# Patient Record
Sex: FEMALE
[De-identification: names, ages, dates, MRNs, and addresses within clinical notes are randomized per-mention and may not be internally consistent; named-entity substitution may affect disease eponyms.]

---

## 2022-06-07 PROBLEM — Z00.00 ENCOUNTER FOR PREVENTIVE HEALTH EXAMINATION: Status: ACTIVE | Noted: 2022-06-07

## 2022-06-14 ENCOUNTER — APPOINTMENT (OUTPATIENT)
Dept: OTOLARYNGOLOGY | Facility: CLINIC | Age: 62
End: 2022-06-14
Payer: MEDICARE

## 2022-06-14 VITALS
WEIGHT: 175 LBS | OXYGEN SATURATION: 98 % | SYSTOLIC BLOOD PRESSURE: 152 MMHG | BODY MASS INDEX: 27.47 KG/M2 | DIASTOLIC BLOOD PRESSURE: 90 MMHG | HEART RATE: 68 BPM | HEIGHT: 67 IN

## 2022-06-14 DIAGNOSIS — Z86.79 PERSONAL HISTORY OF OTHER DISEASES OF THE CIRCULATORY SYSTEM: ICD-10-CM

## 2022-06-14 DIAGNOSIS — Z86.59 PERSONAL HISTORY OF OTHER MENTAL AND BEHAVIORAL DISORDERS: ICD-10-CM

## 2022-06-14 DIAGNOSIS — Z78.9 OTHER SPECIFIED HEALTH STATUS: ICD-10-CM

## 2022-06-14 PROCEDURE — 99203 OFFICE O/P NEW LOW 30 MIN: CPT

## 2022-06-14 NOTE — HISTORY OF PRESENT ILLNESS
[None] : No associated symptoms are reported. [de-identified] :  is a 60 yo female who is being referred by Dr. Levine (oncologist) for suspicious thyroid nodule. She reports this was an incidental finding during a carotid study this year in March. Denies pain, dysphagia, dysphonia and or dyspnea \par 5/24/2022 FNA right mid thyroid 2.3 cm showing positive for malignancy PTCA\par 6/8/2022 TSH wnl, not on thyroid med. \par 5/5/2022 thyroid ultrasound  showing suspicious right thyroid 2.3 cm nodule. No enlarged cervical lymph nodes see.\par Denies family history of thyroid disease or thyroid cancer. \par Never smoked, rare alcohol intake\par Received Covid 19 booster vacc.

## 2022-06-14 NOTE — PHYSICAL EXAM
[Nodule] : nodule [FreeTextEntry1] : sl fullness R lobe thyroid. No palp nodes [Midline] : trachea located in midline position [Normal] : no rashes

## 2022-06-14 NOTE — CONSULT LETTER
[Dear  ___] : Dear  [unfilled], [DrFiorella  ___] : Dr. GRIFFITH [Consult Letter:] : I had the pleasure of evaluating your patient, [unfilled]. [Please see my note below.] : Please see my note below. [Consult Closing:] : Thank you very much for allowing me to participate in the care of this patient.  If you have any questions, please do not hesitate to contact me. [Sincerely,] : Sincerely, [FreeTextEntry2] : John Levine MD ([place], [place]) [FreeTextEntry3] : Edmundo Murray MD, FACS\par \par    [facility]\par Associate Chair\par    Department of Otolaryngology\par \par Professor\par Otolaryngology & Molecular Medicine\par [facility] [employer]\par

## 2022-07-06 ENCOUNTER — APPOINTMENT (OUTPATIENT)
Dept: ENDOCRINOLOGY | Facility: CLINIC | Age: 62
End: 2022-07-06

## 2022-07-06 VITALS
HEIGHT: 67 IN | OXYGEN SATURATION: 99 % | SYSTOLIC BLOOD PRESSURE: 132 MMHG | WEIGHT: 170 LBS | HEART RATE: 76 BPM | BODY MASS INDEX: 26.68 KG/M2 | DIASTOLIC BLOOD PRESSURE: 86 MMHG

## 2022-07-06 PROCEDURE — 99205 OFFICE O/P NEW HI 60 MIN: CPT

## 2022-07-22 DIAGNOSIS — Z01.818 ENCOUNTER FOR OTHER PREPROCEDURAL EXAMINATION: ICD-10-CM

## 2022-07-29 ENCOUNTER — OUTPATIENT (OUTPATIENT)
Dept: OUTPATIENT SERVICES | Facility: HOSPITAL | Age: 62
LOS: 1 days | End: 2022-07-29
Payer: COMMERCIAL

## 2022-07-29 VITALS
HEIGHT: 67.25 IN | RESPIRATION RATE: 16 BRPM | TEMPERATURE: 98 F | OXYGEN SATURATION: 98 % | WEIGHT: 180.34 LBS | HEART RATE: 70 BPM | DIASTOLIC BLOOD PRESSURE: 84 MMHG | SYSTOLIC BLOOD PRESSURE: 146 MMHG

## 2022-07-29 DIAGNOSIS — Z01.818 ENCOUNTER FOR OTHER PREPROCEDURAL EXAMINATION: ICD-10-CM

## 2022-07-29 DIAGNOSIS — C73 MALIGNANT NEOPLASM OF THYROID GLAND: ICD-10-CM

## 2022-07-29 DIAGNOSIS — E04.1 NONTOXIC SINGLE THYROID NODULE: ICD-10-CM

## 2022-07-29 DIAGNOSIS — Z90.49 ACQUIRED ABSENCE OF OTHER SPECIFIED PARTS OF DIGESTIVE TRACT: Chronic | ICD-10-CM

## 2022-07-29 LAB
HCT VFR BLD CALC: 38.2 % — SIGNIFICANT CHANGE UP (ref 34.5–45)
HGB BLD-MCNC: 11.5 G/DL — SIGNIFICANT CHANGE UP (ref 11.5–15.5)
MCHC RBC-ENTMCNC: 23.8 PG — LOW (ref 27–34)
MCHC RBC-ENTMCNC: 30.1 GM/DL — LOW (ref 32–36)
MCV RBC AUTO: 79.1 FL — LOW (ref 80–100)
NRBC # BLD: 0 /100 WBCS — SIGNIFICANT CHANGE UP (ref 0–0)
PLATELET # BLD AUTO: 350 K/UL — SIGNIFICANT CHANGE UP (ref 150–400)
RBC # BLD: 4.83 M/UL — SIGNIFICANT CHANGE UP (ref 3.8–5.2)
RBC # FLD: 15.7 % — HIGH (ref 10.3–14.5)
WBC # BLD: 10.32 K/UL — SIGNIFICANT CHANGE UP (ref 3.8–10.5)
WBC # FLD AUTO: 10.32 K/UL — SIGNIFICANT CHANGE UP (ref 3.8–10.5)

## 2022-07-29 PROCEDURE — 36415 COLL VENOUS BLD VENIPUNCTURE: CPT

## 2022-07-29 PROCEDURE — G0463: CPT

## 2022-07-29 PROCEDURE — 85027 COMPLETE CBC AUTOMATED: CPT

## 2022-07-29 NOTE — H&P PST ADULT - HISTORY OF PRESENT ILLNESS
60 y/o female presents for PST. Per patient she she was diagnosed with a right thyroid nodule 05/2022 and followed up with endocrinologist who performed sonogram and biopsy which resulted cancer of the right thyroid. Patient denies hx of covid and stated that she is covid vaccinated

## 2022-07-29 NOTE — H&P PST ADULT - PROBLEM SELECTOR PLAN 1
Patient schedule for right thyroid lobectomy. Labs, ekg, covid testing, medical and cardiac clearance pending.

## 2022-07-29 NOTE — H&P PST ADULT - FALL HARM RISK - UNIVERSAL INTERVENTIONS
Bed in lowest position, wheels locked, appropriate side rails in place/Call bell, personal items and telephone in reach/Instruct patient to call for assistance before getting out of bed or chair/Non-slip footwear when patient is out of bed/[place] to call system/Physically safe environment - no spills, clutter or unnecessary equipment/Purposeful Proactive Rounding/Room/bathroom lighting operational, light cord in reach

## 2022-07-29 NOTE — H&P PST ADULT - PRO PAIN EXPRESSION
Interval History: Resting comfortably this AM    Review of Systems   Constitutional: Negative for chills and fever.   Respiratory: Negative for cough and shortness of breath.    Cardiovascular: Negative for chest pain and leg swelling.   Gastrointestinal: Negative for abdominal distention and abdominal pain.     Objective:     Vital Signs (Most Recent):  Temp: 98.1 °F (36.7 °C) (01/14/22 1152)  Pulse: 85 (01/14/22 1152)  Resp: 19 (01/14/22 1152)  BP: (!) 159/70 (01/14/22 1152)  SpO2: 96 % (01/14/22 1152) Vital Signs (24h Range):  Temp:  [97.5 °F (36.4 °C)-99.4 °F (37.4 °C)] 98.1 °F (36.7 °C)  Pulse:  [85-98] 85  Resp:  [18-20] 19  SpO2:  [96 %-100 %] 96 %  BP: (122-159)/(63-80) 159/70     Weight: 64.3 kg (141 lb 12.1 oz)  Body mass index is 20.34 kg/m².    Intake/Output Summary (Last 24 hours) at 1/14/2022 1158  Last data filed at 1/13/2022 1800  Gross per 24 hour   Intake 100 ml   Output --   Net 100 ml      Physical Exam  Constitutional:       General: He is not in acute distress.     Appearance: He is ill-appearing. He is not toxic-appearing or diaphoretic.   Cardiovascular:      Rate and Rhythm: Normal rate and regular rhythm.      Heart sounds: No murmur heard.  No gallop.    Pulmonary:      Effort: Pulmonary effort is normal. No respiratory distress.      Breath sounds: Normal breath sounds. No wheezing.   Abdominal:      General: Bowel sounds are normal. There is no distension.      Palpations: Abdomen is soft.      Tenderness: There is no abdominal tenderness.         Significant Labs: All pertinent labs within the past 24 hours have been reviewed.    Significant Imaging: I have reviewed all pertinent imaging results/findings within the past 24 hours.   none

## 2022-07-29 NOTE — H&P PST ADULT - NSICDXFAMILYHX_GEN_ALL_CORE_FT
FAMILY HISTORY:  Father  Still living? Yes, Estimated age: Age Unknown  FH: heart disease, Age at diagnosis: Age Unknown  FH: HTN (hypertension), Age at diagnosis: Age Unknown    Mother  Still living? No  FH: HTN (hypertension), Age at diagnosis: Age Unknown  FH: lung cancer, Age at diagnosis: Age Unknown  FH: ovarian cancer, Age at diagnosis: Age Unknown    Sibling  Still living? Yes, Estimated age: Age Unknown  FH: stomach cancer, Age at diagnosis: Age Unknown

## 2022-08-02 PROBLEM — C73 MALIGNANT NEOPLASM OF THYROID GLAND: Chronic | Status: ACTIVE | Noted: 2022-07-29

## 2022-08-02 PROBLEM — F41.9 ANXIETY DISORDER, UNSPECIFIED: Chronic | Status: ACTIVE | Noted: 2022-07-29

## 2022-08-02 PROBLEM — I10 ESSENTIAL (PRIMARY) HYPERTENSION: Chronic | Status: ACTIVE | Noted: 2022-07-29

## 2022-08-03 LAB — SARS-COV-2 N GENE NPH QL NAA+PROBE: NOT DETECTED

## 2022-08-04 ENCOUNTER — TRANSCRIPTION ENCOUNTER (OUTPATIENT)
Age: 62
End: 2022-08-04

## 2022-08-04 NOTE — ASU PATIENT PROFILE, ADULT - FALL HARM RISK - UNIVERSAL INTERVENTIONS
Bed in lowest position, wheels locked, appropriate side rails in place/Call bell, personal items and telephone in reach/Instruct patient to call for assistance before getting out of bed or chair/Non-slip footwear when patient is out of bed/[place] to call system/Physically safe environment - no spills, clutter or unnecessary equipment/Purposeful Proactive Rounding/Room/bathroom lighting operational, light cord in reach

## 2022-08-04 NOTE — ASU PATIENT PROFILE, ADULT - FALL HARM RISK - TYPE OF ASSESSMENT
December 9, 2019       Patient: Bette Corey   YOB: 1994   Date of Visit: 12/9/2019         To Whom It May Concern:    It is my medical opinion that Bette Corey was not able to attend work today      Sincerely,          Fam Lawrence M.D.  Electronically Signed     
Admission

## 2022-08-05 ENCOUNTER — APPOINTMENT (OUTPATIENT)
Dept: OTOLARYNGOLOGY | Facility: HOSPITAL | Age: 62
End: 2022-08-05

## 2022-08-05 ENCOUNTER — TRANSCRIPTION ENCOUNTER (OUTPATIENT)
Age: 62
End: 2022-08-05

## 2022-08-05 ENCOUNTER — RESULT REVIEW (OUTPATIENT)
Age: 62
End: 2022-08-05

## 2022-08-05 ENCOUNTER — OUTPATIENT (OUTPATIENT)
Dept: OUTPATIENT SERVICES | Facility: HOSPITAL | Age: 62
LOS: 1 days | Discharge: HOME | End: 2022-08-05
Payer: COMMERCIAL

## 2022-08-05 VITALS
WEIGHT: 180.34 LBS | SYSTOLIC BLOOD PRESSURE: 155 MMHG | DIASTOLIC BLOOD PRESSURE: 93 MMHG | HEIGHT: 67.25 IN | HEART RATE: 58 BPM | OXYGEN SATURATION: 100 % | RESPIRATION RATE: 18 BRPM | TEMPERATURE: 98 F

## 2022-08-05 VITALS
DIASTOLIC BLOOD PRESSURE: 81 MMHG | OXYGEN SATURATION: 100 % | HEART RATE: 72 BPM | SYSTOLIC BLOOD PRESSURE: 146 MMHG | RESPIRATION RATE: 14 BRPM

## 2022-08-05 DIAGNOSIS — E04.1 NONTOXIC SINGLE THYROID NODULE: ICD-10-CM

## 2022-08-05 DIAGNOSIS — Z90.49 ACQUIRED ABSENCE OF OTHER SPECIFIED PARTS OF DIGESTIVE TRACT: Chronic | ICD-10-CM

## 2022-08-05 PROCEDURE — 88305 TISSUE EXAM BY PATHOLOGIST: CPT | Mod: 26

## 2022-08-05 PROCEDURE — 60220 PARTIAL REMOVAL OF THYROID: CPT | Mod: GC,RT

## 2022-08-05 PROCEDURE — 88307 TISSUE EXAM BY PATHOLOGIST: CPT | Mod: 26

## 2022-08-05 DEVICE — LIGATING CLIPS WECK HORIZON MEDIUM (BLUE) 24: Type: IMPLANTABLE DEVICE | Status: FUNCTIONAL

## 2022-08-05 DEVICE — SURGICEL 2 X 14": Type: IMPLANTABLE DEVICE | Status: FUNCTIONAL

## 2022-08-05 DEVICE — CARTRIDGE MICROCLIP 30: Type: IMPLANTABLE DEVICE | Status: FUNCTIONAL

## 2022-08-05 DEVICE — LIGATING CLIPS WECK HORIZON SMALL-WIDE (RED) 24: Type: IMPLANTABLE DEVICE | Status: FUNCTIONAL

## 2022-08-05 RX ORDER — LOSARTAN POTASSIUM 100 MG/1
1 TABLET, FILM COATED ORAL
Qty: 0 | Refills: 0 | DISCHARGE

## 2022-08-05 RX ORDER — ACETAMINOPHEN 500 MG
31.25 TABLET ORAL
Qty: 0 | Refills: 0 | DISCHARGE
Start: 2022-08-05

## 2022-08-05 RX ORDER — ACETAMINOPHEN 500 MG
2 TABLET ORAL
Qty: 160 | Refills: 0
Start: 2022-08-05 | End: 2022-08-24

## 2022-08-05 RX ORDER — ONDANSETRON 8 MG/1
4 TABLET, FILM COATED ORAL EVERY 4 HOURS
Refills: 0 | Status: DISCONTINUED | OUTPATIENT
Start: 2022-08-05 | End: 2022-08-19

## 2022-08-05 RX ORDER — ACETAMINOPHEN 500 MG
1000 TABLET ORAL EVERY 6 HOURS
Refills: 0 | Status: DISCONTINUED | OUTPATIENT
Start: 2022-08-05 | End: 2022-08-19

## 2022-08-05 RX ORDER — ESCITALOPRAM OXALATE 10 MG/1
1 TABLET, FILM COATED ORAL
Qty: 0 | Refills: 0 | DISCHARGE

## 2022-08-05 RX ORDER — SODIUM CHLORIDE 9 MG/ML
1000 INJECTION, SOLUTION INTRAVENOUS
Refills: 0 | Status: DISCONTINUED | OUTPATIENT
Start: 2022-08-05 | End: 2022-08-19

## 2022-08-05 NOTE — ASU DISCHARGE PLAN (ADULT/PEDIATRIC) - NURSING INSTRUCTIONS
You received Tylenol in the operating room at 8:00 am. Do not take any medications containing Tylenol (including Percocet) until after 2:00 pm today

## 2022-08-05 NOTE — ASU DISCHARGE PLAN (ADULT/PEDIATRIC) - ASU DC SPECIAL INSTRUCTIONSFT
Activity: No heavy lifting or strenuous activity. Walk as tolerated. Physical therapy per routine. Wound Care: Keep wound dry for 1 day. Then wash your wound Daily with soap and water daily. Pat dry. You may shower. No baths, hot tubs or swimming until approved by your surgeon. Follow up 1 - 2 weeks after discharge. Call office for appointment.  Office: [phone].  Call office for fever >101.5 or redness or drainage from wound.

## 2022-08-05 NOTE — ASU DISCHARGE PLAN (ADULT/PEDIATRIC) - CARE PROVIDER_API CALL
Edmundo Murray)  [facility]; Otolaryngology  [address] [place] [id number]  Phone: [phone]  Fax: [phone]  Follow Up Time:

## 2022-08-06 ENCOUNTER — NON-APPOINTMENT (OUTPATIENT)
Age: 62
End: 2022-08-06

## 2022-08-10 LAB — SURGICAL PATHOLOGY STUDY: SIGNIFICANT CHANGE UP

## 2022-08-11 ENCOUNTER — APPOINTMENT (OUTPATIENT)
Dept: OTOLARYNGOLOGY | Facility: CLINIC | Age: 62
End: 2022-08-11

## 2022-08-11 PROCEDURE — 99024 POSTOP FOLLOW-UP VISIT: CPT

## 2022-08-11 NOTE — HISTORY OF PRESENT ILLNESS
[None] : No associated symptoms are reported. [de-identified] : Ms. Hodges is s/p Right thyroid lobectomy with isthmusectomy on 8/5/2022 for PTCA. Presents today for incision assessment and suture removal. Reports feeling well. Denies fever,  pain, dysphagia, dysphonia and or dyspnea \par Appt. with Dr. Rivas  on 9/8/2022

## 2022-08-11 NOTE — CONSULT LETTER
[Dear  ___] : Dear  [unfilled], [DrFiorella  ___] : Dr. GRIFFITH [Courtesy Letter:] : I had the pleasure of seeing your patient, [unfilled], in my office today. [Please see my note below.] : Please see my note below. [Sincerely,] : Sincerely, [FreeTextEntry2] : John Levine MD ([place], [place]) [FreeTextEntry3] : Virginie Moore NP\par Edmundo Murray MD, FACS\par [facility]\par [address]\[place] [address]\par Tel: [phone]\par \par [facility]\par Associate Chair\par Department of Otolaryngology\par Professor of Otolaryngology & Molecular Medicine\par [employer] [facility] [employer]\par \par

## 2022-08-11 NOTE — REASON FOR VISIT
[Post-Operative Visit] : a post-operative visit [FreeTextEntry2] : s/p Right thyroid lobectomy with isthmusectomy on 8/5/2022

## 2022-09-07 LAB — TSH SERPL-ACNC: 3.86

## 2022-09-08 ENCOUNTER — APPOINTMENT (OUTPATIENT)
Dept: ENDOCRINOLOGY | Facility: CLINIC | Age: 62
End: 2022-09-08

## 2022-09-08 ENCOUNTER — NON-APPOINTMENT (OUTPATIENT)
Age: 62
End: 2022-09-08

## 2022-09-08 VITALS
HEART RATE: 93 BPM | BODY MASS INDEX: 28.72 KG/M2 | WEIGHT: 183 LBS | HEIGHT: 67 IN | SYSTOLIC BLOOD PRESSURE: 130 MMHG | DIASTOLIC BLOOD PRESSURE: 80 MMHG

## 2022-09-08 PROCEDURE — 99215 OFFICE O/P EST HI 40 MIN: CPT

## 2022-09-08 RX ORDER — LOSARTAN POTASSIUM AND HYDROCHLOROTHIAZIDE 12.5; 1 MG/1; MG/1
100-12.5 TABLET ORAL DAILY
Qty: 90 | Refills: 0 | Status: ACTIVE | COMMUNITY

## 2022-09-08 RX ORDER — ESCITALOPRAM OXALATE 5 MG/1
TABLET, FILM COATED ORAL
Refills: 0 | Status: DISCONTINUED | COMMUNITY
End: 2022-09-08

## 2022-09-08 RX ORDER — LOSARTAN POTASSIUM 100 MG/1
TABLET, FILM COATED ORAL
Refills: 0 | Status: DISCONTINUED | COMMUNITY
End: 2022-09-08

## 2022-09-08 RX ORDER — HYDROCHLOROTHIAZIDE 12.5 MG/1
TABLET ORAL
Refills: 0 | Status: DISCONTINUED | COMMUNITY
End: 2022-09-08

## 2022-09-13 ENCOUNTER — APPOINTMENT (OUTPATIENT)
Dept: OTOLARYNGOLOGY | Facility: CLINIC | Age: 62
End: 2022-09-13

## 2022-09-13 VITALS
HEART RATE: 90 BPM | SYSTOLIC BLOOD PRESSURE: 144 MMHG | OXYGEN SATURATION: 98 % | DIASTOLIC BLOOD PRESSURE: 84 MMHG | HEIGHT: 67 IN | BODY MASS INDEX: 28.72 KG/M2 | WEIGHT: 183 LBS

## 2022-09-13 PROCEDURE — 99024 POSTOP FOLLOW-UP VISIT: CPT

## 2022-09-13 NOTE — CONSULT LETTER
[Dear  ___] : Dear  [unfilled], [DrFiorella  ___] : Dr. GRIFFITH [FreeTextEntry2] : John Levine MD ([place], [place]) \par \par

## 2022-09-13 NOTE — HISTORY OF PRESENT ILLNESS
[None] : No associated symptoms are reported. [de-identified] : Ms. Hodges is s/p Right thyroid lobectomy with isthmusectomy on 8/5/2022 for PTCA. Presents today for post op follow up. Denies pain, dysphagia, dysphonia and or dyspnea \par Following up  with Dr. Rivas  \par 9/1/2022 TFTs . Placed  on Levothyroxine 25mcg daily and VItD 4,000 IUs daily otc \par \par Case iscussed in [facility] and concensus is that surveillance at this time appropriate.

## 2022-09-29 ENCOUNTER — APPOINTMENT (OUTPATIENT)
Dept: COLORECTAL SURGERY | Facility: CLINIC | Age: 62
End: 2022-09-29

## 2022-09-29 DIAGNOSIS — Z80.1 FAMILY HISTORY OF MALIGNANT NEOPLASM OF TRACHEA, BRONCHUS AND LUNG: ICD-10-CM

## 2022-09-29 DIAGNOSIS — Z80.41 FAMILY HISTORY OF MALIGNANT NEOPLASM OF OVARY: ICD-10-CM

## 2022-09-29 PROCEDURE — 99441: CPT

## 2022-09-30 PROBLEM — Z80.1 FAMILY HISTORY OF LUNG CANCER: Status: ACTIVE | Noted: 2022-09-30

## 2022-09-30 PROBLEM — Z80.41 FAMILY HISTORY OF OVARIAN CANCER: Status: ACTIVE | Noted: 2022-09-30

## 2022-09-30 NOTE — REASON FOR VISIT
[Home] : at home, [unfilled] , at the time of the visit. [Medical Office: ([facility])___] : at the medical office located in  [Patient] : the patient [Consultation] : a consultation visit

## 2022-09-30 NOTE — HISTORY OF PRESENT ILLNESS
[FreeTextEntry1] : 61-year-old female who presents for a telehealth visit for recent colonoscopy findings of a rectal mass.  Her symptoms began at the beginning of the pandemic in 2020 where she noticed some changes in her bowel function.  She had increased number of bowel movements, changes in the caliber of her stool and excessive gas.  She also reports fecal urgency especially with liquid bowel movements.  She was dealing with her elderly parents and as such did not seek urgent attention for this issue.  She was also noted to have a right thyroid nodule on evaluation of her carotids by her cardiologist which prompted a right thyroidectomy for cancer.  She denies any rectal bleeding.  She has lost about 10 pounds over this period of time.  She underwent a colonoscopy by Dr. Abebe which showed diffusely ulcerated friable mucosa up to 15 cm where there was rectosigmoid narrowing precluding completion of the colonoscopy.  There was also a mass in the rectum and biopsy of this returned as hyperplastic colorectal mucosa with fibropurulent exudate.  The rectosigmoid junction had the same fibropurulent exudate with fragments of high-grade dysplasia plastic colonic mucosa but no malignant cells identified.\par \par CT scan of the abdomen showed a mass at the rectosigmoid junction with exophytic nodules arising from the distal sigmoid as well as circumferential thickening of the rectum.  There is an 11 mm ischio rectal lymph node.\par

## 2022-09-30 NOTE — CONSULT LETTER
[Dear  ___] : Dear  [unfilled], [Consult Letter:] : I had the pleasure of evaluating your patient, [unfilled]. [Please see my note below.] : Please see my note below. [Consult Closing:] : Thank you very much for allowing me to participate in the care of this patient.  If you have any questions, please do not hesitate to contact me. [Sincerely,] : Sincerely, [FreeTextEntry3] : Haile Peter MD\par

## 2022-10-07 ENCOUNTER — RESULT REVIEW (OUTPATIENT)
Age: 62
End: 2022-10-07

## 2022-10-07 ENCOUNTER — APPOINTMENT (OUTPATIENT)
Dept: MRI IMAGING | Facility: CLINIC | Age: 62
End: 2022-10-07

## 2022-10-07 ENCOUNTER — OUTPATIENT (OUTPATIENT)
Dept: OUTPATIENT SERVICES | Facility: HOSPITAL | Age: 62
LOS: 1 days | End: 2022-10-07
Payer: COMMERCIAL

## 2022-10-07 ENCOUNTER — APPOINTMENT (OUTPATIENT)
Dept: RADIOLOGY | Facility: CLINIC | Age: 62
End: 2022-10-07

## 2022-10-07 DIAGNOSIS — K62.89 OTHER SPECIFIED DISEASES OF ANUS AND RECTUM: ICD-10-CM

## 2022-10-07 DIAGNOSIS — Z90.49 ACQUIRED ABSENCE OF OTHER SPECIFIED PARTS OF DIGESTIVE TRACT: Chronic | ICD-10-CM

## 2022-10-07 PROCEDURE — 72197 MRI PELVIS W/O & W/DYE: CPT | Mod: 26

## 2022-10-07 PROCEDURE — 74018 RADEX ABDOMEN 1 VIEW: CPT | Mod: 26

## 2022-10-07 PROCEDURE — 74018 RADEX ABDOMEN 1 VIEW: CPT

## 2022-10-07 PROCEDURE — 72197 MRI PELVIS W/O & W/DYE: CPT

## 2022-10-07 PROCEDURE — A9585: CPT

## 2022-10-17 ENCOUNTER — NON-APPOINTMENT (OUTPATIENT)
Age: 62
End: 2022-10-17

## 2022-10-24 LAB — SARS-COV-2 N GENE NPH QL NAA+PROBE: NOT DETECTED

## 2022-10-26 ENCOUNTER — APPOINTMENT (OUTPATIENT)
Dept: COLORECTAL SURGERY | Facility: AMBULATORY SURGERY CENTER | Age: 62
End: 2022-10-26
Payer: COMMERCIAL

## 2022-10-26 ENCOUNTER — RESULT REVIEW (OUTPATIENT)
Age: 62
End: 2022-10-26

## 2022-10-26 DIAGNOSIS — K62.89 OTHER SPECIFIED DISEASES OF ANUS AND RECTUM: ICD-10-CM

## 2022-10-26 PROCEDURE — 46606 ANOSCOPY AND BIOPSY: CPT

## 2022-10-30 PROBLEM — K62.89 RECTAL MASS: Status: ACTIVE | Noted: 2022-09-29

## 2022-11-29 NOTE — ASU PREOP CHECKLIST - DENTURES
Name from pharmacy: VICTOZA 18MG/3ML INJ PEN 3ML(3PACK)          Will file in chart as: Victoza 18 MG/3ML pen-injector    Sig: ADMINISTER 1.2 MG UNDER THE SKIN DAILY    Disp:  9 mL    Refills:  3 (Pharmacy requested: Not specified)    Start: 11/29/2022    Class: Eprescribe    For: Type 2 diabetes mellitus without complication, with long-term current use of insulin (CMS/[facility])    Last ordered: 5 months ago by Micheal Hendrix MD Last refill: 10/30/2022    Rx #: [id number]     Medication filled per protocol, appointment pending, last seen 7/8/22    
no

## 2023-01-04 LAB — TSH SERPL-ACNC: 5.85

## 2023-01-05 ENCOUNTER — APPOINTMENT (OUTPATIENT)
Dept: ENDOCRINOLOGY | Facility: CLINIC | Age: 63
End: 2023-01-05
Payer: MEDICAID

## 2023-01-05 ENCOUNTER — APPOINTMENT (OUTPATIENT)
Dept: ENDOCRINOLOGY | Facility: CLINIC | Age: 63
End: 2023-01-05
Payer: COMMERCIAL

## 2023-01-05 VITALS
WEIGHT: 175 LBS | HEIGHT: 67 IN | SYSTOLIC BLOOD PRESSURE: 112 MMHG | OXYGEN SATURATION: 98 % | HEART RATE: 76 BPM | DIASTOLIC BLOOD PRESSURE: 74 MMHG | BODY MASS INDEX: 27.47 KG/M2

## 2023-01-05 PROCEDURE — 76536 US EXAM OF HEAD AND NECK: CPT

## 2023-01-05 PROCEDURE — 99215 OFFICE O/P EST HI 40 MIN: CPT

## 2023-01-08 NOTE — PROCEDURE
[[employer] e 2008 model, 10-12 MHz frequencies] : multiple real time longitudinal and transverse images were obtained using a high resolution ultrasound with a linear transducer, [employer] e 2008 model, 10-12 MHz frequencies. All measurements will be reported as longitudinal x rosy-posterior x transverse. [Report dated ___] : Report dated [unfilled] [Thyroid Cancer] : thyroid cancer [] : a heterogeneous parenchyma [There are no distinct nodules visualized.] : There are no distinct nodules visualized. [FreeTextEntry5] : 4.1x1.7x1.2

## 2023-01-08 NOTE — IMPRESSION
[FreeTextEntry1] : s/p R lobectomy and isthmusectomy. Mildly heterogenous left thyroid lobe. No nodules visualized [FreeTextEntry2] : repeat ultrasound in 6 months

## 2023-03-06 ENCOUNTER — RX RENEWAL (OUTPATIENT)
Age: 63
End: 2023-03-06

## 2023-03-14 ENCOUNTER — APPOINTMENT (OUTPATIENT)
Dept: OTOLARYNGOLOGY | Facility: CLINIC | Age: 63
End: 2023-03-14

## 2023-05-24 LAB — TSH SERPL-ACNC: 8.71

## 2023-05-25 ENCOUNTER — APPOINTMENT (OUTPATIENT)
Dept: ENDOCRINOLOGY | Facility: CLINIC | Age: 63
End: 2023-05-25
Payer: MEDICAID

## 2023-05-25 VITALS
DIASTOLIC BLOOD PRESSURE: 70 MMHG | HEIGHT: 67 IN | BODY MASS INDEX: 29.03 KG/M2 | WEIGHT: 185 LBS | OXYGEN SATURATION: 97 % | SYSTOLIC BLOOD PRESSURE: 110 MMHG | HEART RATE: 77 BPM

## 2023-05-25 PROCEDURE — 99214 OFFICE O/P EST MOD 30 MIN: CPT

## 2023-05-25 RX ORDER — LEVOTHYROXINE SODIUM 0.03 MG/1
25 TABLET ORAL DAILY
Qty: 90 | Refills: 1 | Status: DISCONTINUED | COMMUNITY
Start: 2023-03-06 | End: 2023-05-25

## 2023-08-03 ENCOUNTER — APPOINTMENT (OUTPATIENT)
Dept: ENDOCRINOLOGY | Facility: CLINIC | Age: 63
End: 2023-08-03
Payer: MEDICAID

## 2023-08-03 VITALS
HEART RATE: 71 BPM | BODY MASS INDEX: 27 KG/M2 | OXYGEN SATURATION: 98 % | DIASTOLIC BLOOD PRESSURE: 80 MMHG | SYSTOLIC BLOOD PRESSURE: 120 MMHG | HEIGHT: 67 IN | WEIGHT: 172 LBS

## 2023-08-03 DIAGNOSIS — Z13.1 ENCOUNTER FOR SCREENING FOR DIABETES MELLITUS: ICD-10-CM

## 2023-08-03 DIAGNOSIS — E04.1 NONTOXIC SINGLE THYROID NODULE: ICD-10-CM

## 2023-08-03 DIAGNOSIS — C20 MALIGNANT NEOPLASM OF RECTUM: ICD-10-CM

## 2023-08-03 PROCEDURE — 76536 US EXAM OF HEAD AND NECK: CPT

## 2023-08-03 PROCEDURE — 99215 OFFICE O/P EST HI 40 MIN: CPT

## 2023-08-03 NOTE — REASON FOR VISIT
[Follow - Up] : a follow-up visit [Thyroid Cancer] : thyroid cancer [FreeTextEntry2] : Dr. Edmundo Murray

## 2023-08-03 NOTE — ASSESSMENT
[FreeTextEntry1] : 62F w/ R sided PTC (2.9cm CV) s/p R lobectomy/isthmusectomy (8/5/22, Dr. Murray), rectal cancer s/p chemo/XRT and vit d def here for follow up. complains of fatigue rtc in 6months with sono same day and labs prior  PTC, hypothyroidism, intact left thyroid lobe -stage 1, T2NxMx, low risk -does not need completion at this time as it is low risk -tsh goal <2 -tsh 2.95, will increase to LT4 88mcg M-F and 100mcg Sat/Sun -TG low at baseline -B5avjuj ultrasound  Rectal cancer- recently diagnosed. seeing onc. s/p chemo/XRT. emotional support given extensively  vit d def- continue otc vit D 4000 IU daily. check levels next time

## 2023-08-03 NOTE — HISTORY OF PRESENT ILLNESS
[FreeTextEntry1] : here for thyroid cancer  has rectal cancer 9/2022 s/p chemo/XRT  mom passed away from lung cancer  thyroid hx: incidentally seen on carotid duplex thyroid ultrasound: 5/5/2022 RMP nodule 2.3cm, s/p FNA with bethes[employer] V no FH of thyroid issue/cancer or personal hx of head/neck radiation  FH of diabetes: father (prediabetes) menopause: 59yo  s/p R thyroid lobectomy with isthmusectomy 8/5/22 for PTC, no GIMENEZ  interval history was anemic, got some iron infusions done with chemo and XRT 6/13/23 at [facility], saw Dr. Argueta and s/p partial colectomy now with an ileostomy, MAK/BSO daughter lives in [place] (), has 2 grandkids --> did not get to see brother in [place] father, 90, [place] but not the greatest support getting support from [employer]  feeling better labs pending sono with benign appearing LN seeing a therapist  meds for thyroid: LT4 88cg daily

## 2023-08-03 NOTE — PHYSICAL EXAM
[Alert] : alert [Well Nourished] : well nourished [No Acute Distress] : no acute distress [Well Developed] : well developed [Normal Sclera/Conjunctiva] : normal sclera/conjunctiva [EOMI] : extra ocular movement intact [No Proptosis] : no proptosis [Normal Oropharynx] : the oropharynx was normal [Well Healed Scar] : well healed scar [No Respiratory Distress] : no respiratory distress [No Accessory Muscle Use] : no accessory muscle use [Clear to Auscultation] : lungs were clear to auscultation bilaterally [Normal S1, S2] : normal S1 and S2 [Normal Rate] : heart rate was normal [Regular Rhythm] : with a regular rhythm [Normal Bowel Sounds] : normal bowel sounds [Not Tender] : non-tender [Not Distended] : not distended [Soft] : abdomen soft [No Stigmata of Cushings Syndrome] : no stigmata of Cushings Syndrome [Normal Gait] : normal gait [Normal Strength/Tone] : muscle strength and tone were normal [No Rash] : no rash [Normal Reflexes] : deep tendon reflexes were 2+ and symmetric [No Tremors] : no tremors [Oriented x3] : oriented to person, place, and time [Normal Affect] : the affect was normal [Normal Mood] : the mood was normal [Acanthosis Nigricans] : no acanthosis nigricans [de-identified] : palpable left thyroid

## 2023-08-06 NOTE — IMPRESSION
[FreeTextEntry1] : s/p R lobectomy and isthmusectomy. Mildly heterogenous left thyroid lobe. No nodules visualized normal appearing right lymph node [FreeTextEntry2] : repeat ultrasound in 6 months

## 2023-08-06 NOTE — PROCEDURE
["[employer] e 2008 model, 10-12 MHz frequencies] : multiple real time longitudinal and transverse images were obtained using a high resolution ultrasound with a linear transducer, "[employer] e 2008 model, 10-12 MHz frequencies. All measurements will be reported as longitudinal x rosy-posterior x transverse. [Report dated ___] : Report dated [unfilled] [Thyroid Cancer] : thyroid cancer [] : a heterogeneous parenchyma [There are no distinct nodules visualized.] : There are no distinct nodules visualized. [No abnormal lymph nodes are seen.] : no abnormal lymph nodes are seen [FreeTextEntry5] : 3.8x1.5x1.1 [FreeTextEntry8] : R level II LN measuring 0.5x0.4x0.5cm, round with large fatty hilum

## 2023-11-17 ENCOUNTER — RX RENEWAL (OUTPATIENT)
Age: 63
End: 2023-11-17

## 2024-01-25 ENCOUNTER — RX RENEWAL (OUTPATIENT)
Age: 64
End: 2024-01-25

## 2024-02-08 ENCOUNTER — APPOINTMENT (OUTPATIENT)
Dept: ENDOCRINOLOGY | Facility: CLINIC | Age: 64
End: 2024-02-08

## 2024-03-21 ENCOUNTER — APPOINTMENT (OUTPATIENT)
Dept: ENDOCRINOLOGY | Facility: CLINIC | Age: 64
End: 2024-03-21
Payer: COMMERCIAL

## 2024-03-21 VITALS
HEART RATE: 70 BPM | BODY MASS INDEX: 31.39 KG/M2 | DIASTOLIC BLOOD PRESSURE: 80 MMHG | HEIGHT: 67 IN | WEIGHT: 200 LBS | OXYGEN SATURATION: 98 % | SYSTOLIC BLOOD PRESSURE: 138 MMHG

## 2024-03-21 DIAGNOSIS — E55.9 VITAMIN D DEFICIENCY, UNSPECIFIED: ICD-10-CM

## 2024-03-21 DIAGNOSIS — E03.9 HYPOTHYROIDISM, UNSPECIFIED: ICD-10-CM

## 2024-03-21 DIAGNOSIS — C73 MALIGNANT NEOPLASM OF THYROID GLAND: ICD-10-CM

## 2024-03-21 LAB — TSH SERPL-ACNC: 4.43

## 2024-03-21 PROCEDURE — 99215 OFFICE O/P EST HI 40 MIN: CPT

## 2024-03-21 RX ORDER — LEVOTHYROXINE SODIUM 0.1 MG/1
100 TABLET ORAL
Qty: 90 | Refills: 1 | Status: ACTIVE | COMMUNITY
Start: 2023-08-03 | End: 1900-01-01

## 2024-03-21 RX ORDER — LEVOTHYROXINE SODIUM 0.09 MG/1
88 TABLET ORAL DAILY
Qty: 80 | Refills: 1 | Status: DISCONTINUED | COMMUNITY
Start: 2022-09-08 | End: 2024-03-21

## 2024-03-21 NOTE — PHYSICAL EXAM
[Alert] : alert [Well Nourished] : well nourished [No Acute Distress] : no acute distress [Well Developed] : well developed [Normal Sclera/Conjunctiva] : normal sclera/conjunctiva [EOMI] : extra ocular movement intact [No Proptosis] : no proptosis [Well Healed Scar] : well healed scar [Normal Oropharynx] : the oropharynx was normal [No Respiratory Distress] : no respiratory distress [No Accessory Muscle Use] : no accessory muscle use [Clear to Auscultation] : lungs were clear to auscultation bilaterally [Normal S1, S2] : normal S1 and S2 [Normal Rate] : heart rate was normal [Regular Rhythm] : with a regular rhythm [Normal Bowel Sounds] : normal bowel sounds [Not Tender] : non-tender [Not Distended] : not distended [Soft] : abdomen soft [No Stigmata of Cushings Syndrome] : no stigmata of Cushings Syndrome [Normal Gait] : normal gait [Normal Strength/Tone] : muscle strength and tone were normal [No Rash] : no rash [Normal Reflexes] : deep tendon reflexes were 2+ and symmetric [No Tremors] : no tremors [Oriented x3] : oriented to person, place, and time [Normal Affect] : the affect was normal [Normal Mood] : the mood was normal [de-identified] : palpable left thyroid [Acanthosis Nigricans] : no acanthosis nigricans

## 2024-03-21 NOTE — HISTORY OF PRESENT ILLNESS
[FreeTextEntry1] : here for thyroid cancer  has rectal cancer 9/2022 s/p chemo/XRT  mom passed away from lung cancer  thyroid hx: incidentally seen on carotid duplex thyroid ultrasound: 5/5/2022 RMP nodule 2.3cm, s/p FNA with bethes[employer] V no FH of thyroid issue/cancer or personal hx of head/neck radiation  FH of diabetes: father (prediabetes) menopause: 57yo  s/p R thyroid lobectomy with isthmusectomy 8/5/22 for PTC, no GIMENEZ 6/13/23 at [facility], saw Dr. Argueta and colon cancer s/p partial colectomy now with an ileostomy, MAK/BSO 9/2023 has ileostomy reversal  interval history started new job, works as a  feeling better daughter lives in [place] (), has 2 grandkids --> might be able to see in may  brother in [place] father, 90, lives on  but not the greatest support labs reviewed- tsh 4.4 gained about 25 lbs last sono 8/2023 with benign appearing LN seeing a therapist and still part of the [employer]  meds for thyroid: LT4 88mcg M-F and 100mcg Sat/Sun

## 2024-10-17 ENCOUNTER — APPOINTMENT (OUTPATIENT)
Dept: ENDOCRINOLOGY | Facility: CLINIC | Age: 64
End: 2024-10-17

## 2025-04-09 LAB
HBA1C MFR BLD HPLC: 5.5
LDLC SERPL DIRECT ASSAY-MCNC: 122
TSH SERPL-ACNC: 3.81

## 2025-04-10 ENCOUNTER — NON-APPOINTMENT (OUTPATIENT)
Age: 65
End: 2025-04-10

## 2025-04-10 ENCOUNTER — APPOINTMENT (OUTPATIENT)
Dept: ENDOCRINOLOGY | Facility: CLINIC | Age: 65
End: 2025-04-10
Payer: COMMERCIAL

## 2025-04-10 VITALS
WEIGHT: 214 LBS | SYSTOLIC BLOOD PRESSURE: 120 MMHG | OXYGEN SATURATION: 96 % | DIASTOLIC BLOOD PRESSURE: 80 MMHG | HEART RATE: 63 BPM | HEIGHT: 67 IN | BODY MASS INDEX: 33.59 KG/M2

## 2025-04-10 DIAGNOSIS — E03.9 HYPOTHYROIDISM, UNSPECIFIED: ICD-10-CM

## 2025-04-10 DIAGNOSIS — E55.9 VITAMIN D DEFICIENCY, UNSPECIFIED: ICD-10-CM

## 2025-04-10 DIAGNOSIS — C73 MALIGNANT NEOPLASM OF THYROID GLAND: ICD-10-CM

## 2025-04-10 DIAGNOSIS — R59.0 LOCALIZED ENLARGED LYMPH NODES: ICD-10-CM

## 2025-04-10 PROCEDURE — 99215 OFFICE O/P EST HI 40 MIN: CPT

## 2025-04-10 PROCEDURE — G2211 COMPLEX E/M VISIT ADD ON: CPT | Mod: NC

## 2025-05-05 ENCOUNTER — NON-APPOINTMENT (OUTPATIENT)
Age: 65
End: 2025-05-05

## 2025-05-14 ENCOUNTER — APPOINTMENT (OUTPATIENT)
Dept: OTOLARYNGOLOGY | Facility: CLINIC | Age: 65
End: 2025-05-14
Payer: COMMERCIAL

## 2025-05-14 VITALS
RESPIRATION RATE: 16 BRPM | OXYGEN SATURATION: 97 % | DIASTOLIC BLOOD PRESSURE: 78 MMHG | HEART RATE: 68 BPM | SYSTOLIC BLOOD PRESSURE: 146 MMHG

## 2025-05-14 DIAGNOSIS — C73 MALIGNANT NEOPLASM OF THYROID GLAND: ICD-10-CM

## 2025-05-14 DIAGNOSIS — C77.0 SECONDARY AND UNSPECIFIED MALIGNANT NEOPLASM OF LYMPH NODES OF HEAD, FACE AND NECK: ICD-10-CM

## 2025-05-14 PROCEDURE — 31575 DIAGNOSTIC LARYNGOSCOPY: CPT

## 2025-05-14 PROCEDURE — 99214 OFFICE O/P EST MOD 30 MIN: CPT | Mod: 25

## 2025-05-22 ENCOUNTER — APPOINTMENT (OUTPATIENT)
Dept: MRI IMAGING | Facility: CLINIC | Age: 65
End: 2025-05-22

## 2025-05-22 ENCOUNTER — OUTPATIENT (OUTPATIENT)
Dept: OUTPATIENT SERVICES | Facility: HOSPITAL | Age: 65
LOS: 1 days | End: 2025-05-22
Payer: COMMERCIAL

## 2025-05-22 DIAGNOSIS — C73 MALIGNANT NEOPLASM OF THYROID GLAND: ICD-10-CM

## 2025-05-22 DIAGNOSIS — Z90.49 ACQUIRED ABSENCE OF OTHER SPECIFIED PARTS OF DIGESTIVE TRACT: Chronic | ICD-10-CM

## 2025-05-22 DIAGNOSIS — Z00.8 ENCOUNTER FOR OTHER GENERAL EXAMINATION: ICD-10-CM

## 2025-05-22 PROCEDURE — 70542 MRI ORBIT/FACE/NECK W/DYE: CPT | Mod: 26

## 2025-05-22 PROCEDURE — 70542 MRI ORBIT/FACE/NECK W/DYE: CPT

## 2025-05-22 PROCEDURE — A9585: CPT

## 2025-05-30 ENCOUNTER — RESULT REVIEW (OUTPATIENT)
Age: 65
End: 2025-05-30

## 2025-06-12 ENCOUNTER — APPOINTMENT (OUTPATIENT)
Dept: ULTRASOUND IMAGING | Facility: CLINIC | Age: 65
End: 2025-06-12

## 2025-07-03 ENCOUNTER — OUTPATIENT (OUTPATIENT)
Dept: OUTPATIENT SERVICES | Facility: HOSPITAL | Age: 65
LOS: 1 days | End: 2025-07-03

## 2025-07-03 VITALS
OXYGEN SATURATION: 96 % | TEMPERATURE: 98 F | SYSTOLIC BLOOD PRESSURE: 120 MMHG | RESPIRATION RATE: 16 BRPM | DIASTOLIC BLOOD PRESSURE: 83 MMHG | HEIGHT: 66.75 IN | WEIGHT: 212.08 LBS | HEART RATE: 63 BPM

## 2025-07-03 DIAGNOSIS — C73 MALIGNANT NEOPLASM OF THYROID GLAND: ICD-10-CM

## 2025-07-03 DIAGNOSIS — E89.0 POSTPROCEDURAL HYPOTHYROIDISM: Chronic | ICD-10-CM

## 2025-07-03 DIAGNOSIS — Z90.710 ACQUIRED ABSENCE OF BOTH CERVIX AND UTERUS: Chronic | ICD-10-CM

## 2025-07-03 DIAGNOSIS — Z90.49 ACQUIRED ABSENCE OF OTHER SPECIFIED PARTS OF DIGESTIVE TRACT: Chronic | ICD-10-CM

## 2025-07-03 DIAGNOSIS — Z98.890 OTHER SPECIFIED POSTPROCEDURAL STATES: Chronic | ICD-10-CM

## 2025-07-03 NOTE — H&P PST ADULT - NSICDXFAMILYHX_GEN_ALL_CORE_FT
FAMILY HISTORY:  Father  Still living? Yes, Estimated age: Age Unknown  Family history of pacemaker, Age at diagnosis: Age Unknown  FH: heart disease, Age at diagnosis: Age Unknown  FH: HTN (hypertension), Age at diagnosis: Age Unknown    Mother  Still living? No  FH: HTN (hypertension), Age at diagnosis: Age Unknown  FH: lung cancer, Age at diagnosis: Age Unknown  FH: ovarian cancer, Age at diagnosis: Age Unknown    Sibling  Still living? Yes, Estimated age: Age Unknown  FH: stomach cancer, Age at diagnosis: Age Unknown

## 2025-07-03 NOTE — H&P PST ADULT - NSICDXPASTMEDICALHX_GEN_ALL_CORE_FT
PAST MEDICAL HISTORY:  Anemia     Anxiety and depression     Colon cancer     Endometrial cancer     HTN (hypertension)     Patient on combined chemotherapy and radiation     Thyroid cancer      PAST MEDICAL HISTORY:  Anemia     Anxiety and depression     Colon cancer     Endometrial cancer     HTN (hypertension)     Obese     Patient on combined chemotherapy and radiation     Thyroid cancer      PAST MEDICAL HISTORY:  Anemia     Anxiety and depression     Cervical lymphadenopathy     Colon cancer     Endometrial cancer     HTN (hypertension)     Obese     Papillary thyroid carcinoma     Patient on combined chemotherapy and radiation

## 2025-07-03 NOTE — H&P PST ADULT - HISTORY OF PRESENT ILLNESS
Pt. is a 63 yo female with a PMHX of thyroid cancer, colon cancer, endometrial cancer, and HTN.  Pt. presents to PST for malignant neoplasm of thyroid gland to be evaluated for a left thyroidectomy, right neck dissection.      Surgeon's Note: 63yo female who is being referred by Dr. Rivas for neck lymph node. Reports lymph node was found enlarged on recent sonogram done last month. recent Rx colon CA, RT and chemo, then surgery 6/23. endometrial CA found at same time. No addl Rx after surgery.  hx. Right thyroid lobectomy with isthmusectomy on 8/5/2022 for PTCA, final path 2.9cm ptca  Has been under surveillance for Left remnant after the case was discussed in [facility].  Denies pain, dysphagia, dysphonia and or dyspnea  4/29/2025 FNA right cervical lymph node-involved by metastatic carcinoma with papillary architecture and focal psammoma body. Flow Cytometry-no immunophenotypic evidence of lymphoproliferative disorder  4/2/20025 Thyroid US reporting sp right sided hethyroidectomy. heterogeneous left thyroid lobe without discrete nodule. Non specific atypical appearing right sided level 3 cervical lymph node measuring 1.9cm which demonstrated a somewhat diminished fatty hilium.  4/4/2025 TSH 2.36 thyroglobulin 2.3 thyroglobulin antibodies <1 Thyroglobulin Peroxidase <1 On Levothyroxine 112mcg daily  2022 treated for colon cancer and endometrial cancer  Denies cardiac or lung issues. Not on blood thinners.  Non smoker, never. No alcohol intake  ? Pt. is a 65 yo female with a PMHX of thyroid cancer, colon cancer, endometrial cancer, and HTN.  Pt. presents to PST for malignant neoplasm of thyroid gland to be evaluated for a left thyroidectomy, right neck dissection.       Surgeon's Note 5/14/25: "65yo female who is being referred by Dr. Rivas for neck lymph node. Reports lymph node was found enlarged on recent sonogram done last month. recent Rx colon CA, RT and chemo, then surgery 6/23. endometrial CA found at same time. No addl Rx after surgery.  hx. Right thyroid lobectomy with isthmusectomy on 8/5/2022 for PTCA, final path 2.9cm ptca  Has been under surveillance for Left remnant after the case was discussed in [facility].  Denies pain, dysphagia, dysphonia and or dyspnea  4/29/2025 FNA right cervical lymph node-involved by metastatic carcinoma with papillary architecture and focal psammoma body. Flow Cytometry-no immunophenotypic evidence of lymphoproliferative disorder  4/2/20025 Thyroid US reporting sp right sided hemithyroidectomy. heterogeneous left thyroid lobe without discrete nodule. Non specific atypical appearing right sided level 3 cervical lymph node measuring 1.9cm which demonstrated a somewhat diminished fatty hilium."  4/4/2025 TSH 2.36 thyroglobulin 2.3 thyroglobulin antibodies <1 Thyroglobulin Peroxidase <1 On Levothyroxine 112mcg daily  2022 treated for colon cancer and endometrial cancer  Denies cardiac or lung issues. Not on blood thinners.  Non smoker, never. No alcohol intake  Surgeon's Note 5/14/25:    Pt. is a 63 yo female with a PMHX of papillary thyroid carcinoma, colon cancer, endometrial cancer, and HTN.  Pt. presents to PST for malignant neoplasm of thyroid gland to be evaluated for a left thyroidectomy, right neck dissection.       Surgeon's Note 5/14/25: "65yo female who is being referred by Dr. Rivas for neck lymph node. Reports lymph node was found enlarged on recent sonogram done last month. recent Rx colon CA, RT and chemo, then surgery 6/23. endometrial CA found at same time. No addl Rx after surgery.  hx. Right thyroid lobectomy with isthmusectomy on 8/5/2022 for PTCA, final path 2.9cm ptca  Has been under surveillance for Left remnant after the case was discussed in [facility].  Denies pain, dysphagia, dysphonia and or dyspnea  4/29/2025 FNA right cervical lymph node-involved by metastatic carcinoma with papillary architecture and focal psammoma body. Flow Cytometry-no immunophenotypic evidence of lymphoproliferative disorder  4/2/20025 Thyroid US reporting sp right sided hemithyroidectomy. heterogeneous left thyroid lobe without discrete nodule. Non specific atypical appearing right sided level 3 cervical lymph node measuring 1.9cm which demonstrated a somewhat diminished fatty hilium."  4/4/2025 TSH 2.36 thyroglobulin 2.3 thyroglobulin antibodies <1 Thyroglobulin Peroxidase <1 On Levothyroxine 112mcg daily  2022 treated for colon cancer and endometrial cancer

## 2025-07-03 NOTE — H&P PST ADULT - NSICDXPASTSURGICALHX_GEN_ALL_CORE_FT
PAST SURGICAL HISTORY:  H/O partial thyroidectomy     History of cholecystectomy 1986    History of closure of ileostomy     History of colon resection     S/P MAK-BSO (total abdominal hysterectomy and bilateral salpingo-oophorectomy)

## 2025-07-03 NOTE — H&P PST ADULT - NSANTHOSAYNRD_GEN_A_CORE
unknown symptoms "because I don't sleep with anybody"/No. JACEK screening performed.  STOP BANG Legend: 0-2 = LOW Risk; 3-4 = INTERMEDIATE Risk; 5-8 = HIGH Risk

## 2025-07-03 NOTE — H&P PST ADULT - PROBLEM SELECTOR PLAN 1
Pt. is scheduled for a left thyroidectomy, right neck dissection 7/14/25.  Pt. instructed to take Losartan-HCTZ and Levothyroxine morning of surgery.  Pt. verbalized understanding of instructions and that Chlorhexidine is for external use.

## 2025-07-03 NOTE — H&P PST ADULT - OTHER CARE PROVIDERS
Dr. Hinton, Cardiologist [phone]; Dr. Levine, Oncologist [phone]; Dr. Rivas, Endocrinologist [phone]

## 2025-07-04 PROBLEM — C73 MALIGNANT NEOPLASM OF THYROID GLAND: Chronic | Status: INACTIVE | Noted: 2022-07-29 | Resolved: 2025-07-03

## 2025-07-11 NOTE — ASU PATIENT PROFILE, ADULT - ACCEPTABLE
[address] [place] [address]  Progress Note  Name: Benja Mcintyre  MRN: [id number]  Unit/Bed#: -01 I Date of Admission: 5/19/2023   Date of Service: 5/20/2023 I Hospital Day: 1    Assessment/Plan   * Pneumonia  Assessment & Plan  · 2 days of worsening tachypnea, increased work of breathing, fevers per mom  · On admission, patient placed on 4 L nasal cannula due to increased work of breathing, due to persistent coughing fit now placed on 5 L nasal cannula saturating mid 90s, continue to monitor  Now satting well on room air  · Chest x-ray revealing left upper lobe and left middle lobe opacity  · COVID test negative  · Incentive spirometry, respiratory protocol  · Follow-up sputum culture and Gram stain, strep pneumoniae, Legionella  · Continue IV ceftriaxone 1 g daily  · As needed nebulizer treatments    Sepsis [facility])  Assessment & Plan  · Meeting criteria due to being febrile, tachycardic, tachypneic in the setting of pneumonia  · Lactic acid initially elevated at 4 2, continue to trend until less than 2  Lactic no normal post IV fluid  · Procalcitonin significantly elevated  · BC x2 negative at 24 hours  · Given IV fluid bolus in the ED, will give another IV fluid bolus now and continue aggressive IV fluid hydration  · Continue IV antibiotic    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  Supportive care         VTE Pharmacologic Prophylaxis:   Pharmacologic: Low risk  Mechanical VTE Prophylaxis in Place: No    Review of Systems:    Review of Systems   Unable to perform ROS: Patient nonverbal       Past Medical and Surgical History:     Past Medical History:   Diagnosis Date   • Rett syndrome        History reviewed  No pertinent surgical history  Discussions with Specialists or Other Care Team Provider: Nursing    Education and Discussions with Family / Patient: Patient's mom at bedside    Time Spent for Care: 28    More than 50% of total time spent on counseling and coordination of care as described above  Current Length of Stay: 1 day(s)    Current Patient Status: Inpatient   Certification Statement: The patient will continue to require additional inpatient hospital stay due to IV antibiotics    Discharge Plan: Likely tomorrow if patient remained afebrile without any distress  Code Status: Level 1 - Full Code      Subjective:   Patient's mom reports noticeable improvement in patient's respiratory status  Objective:     Vitals:   Temp (24hrs), Av 1 °F (36 7 °C), Min:97 3 °F (36 3 °C), Max:98 6 °F (37 °C)    Temp:  [97 3 °F (36 3 °C)-98 6 °F (37 °C)] 98 6 °F (37 °C)  HR:  [] 98  Resp:  [18-36] 18  BP: (109-136)/(66-82) 117/76  SpO2:  [96 %-99 %] 98 %  Body mass index is 19 06 kg/m²  Input and Output Summary (last 24 hours):     No intake or output data in the 24 hours ending 23 1229    Physical Exam:     Physical Exam  Vitals and nursing note reviewed  Constitutional:       General: She is not in acute distress  Appearance: She is well-developed  She is not ill-appearing or diaphoretic  HENT:      Head: Normocephalic and atraumatic  Mouth/Throat:      Mouth: Mucous membranes are moist       Pharynx: Oropharynx is clear  Eyes:      Conjunctiva/sclera: Conjunctivae normal    Cardiovascular:      Rate and Rhythm: Normal rate and regular rhythm  Heart sounds: No murmur heard  Pulmonary:      Effort: Pulmonary effort is normal  No respiratory distress  Comments: Decreased breath sounds on the left side  Abdominal:      General: Bowel sounds are normal       Palpations: Abdomen is soft  Tenderness: There is no abdominal tenderness  Musculoskeletal:         General: No swelling  Cervical back: Neck supple  Right lower leg: No edema  Left lower leg: No edema  Skin:     General: Skin is warm and dry  Capillary Refill: Capillary refill takes less than 2 seconds     Neurological: General: No focal deficit present  Mental Status: She is alert  Mental status is at baseline  Psychiatric:         Mood and Affect: Mood normal          Behavior: Behavior normal            Additional Data:     Labs:    Results from last 7 days   Lab Units 05/20/23  0617 05/19/23  0218   WBC Thousand/uL 13 37* 9 65   HEMOGLOBIN g/dL 11 5 14 5   HEMATOCRIT % 34 1* 44 3   PLATELETS Thousands/uL 255 296   BANDS PCT %  --  6   LYMPHO PCT %  --  14   MONO PCT %  --  3*   EOS PCT %  --  0     Results from last 7 days   Lab Units 05/19/23  0218   SODIUM mmol/L 137   POTASSIUM mmol/L 3 6   CHLORIDE mmol/L 104   CO2 mmol/L 25   BUN mg/dL 20   CREATININE mg/dL 0 68   ANION GAP mmol/L 8   CALCIUM mg/dL 9 6   ALBUMIN g/dL 3 7   TOTAL BILIRUBIN mg/dL 0 85   ALK PHOS U/L 74   ALT U/L 16   AST U/L 19   GLUCOSE RANDOM mg/dL 132                 Results from last 7 days   Lab Units 05/20/23  0105 05/19/23  2252 05/19/23  2016 05/19/23  1446 05/19/23  0337 05/19/23  0218   LACTIC ACID mmol/L 1 8 2 3* 3 9* 4 8*   < >  --    PROCALCITONIN ng/ml  --   --   --   --   --  17 92*    < > = values in this interval not displayed  * I Have Reviewed All Lab Data Listed Above  * Additional Pertinent Lab Tests Reviewed: Darnell 65 Admission Reviewed    Imaging:    Imaging Reports Reviewed Today Include: CT chest  Imaging Personally Reviewed by Myself Includes:  none    Recent Cultures (last 7 days):     Results from last 7 days   Lab Units 05/19/23  0258 05/19/23  0245   BLOOD CULTURE  No Growth at 24 hrs  No Growth at 24 hrs         Last 24 Hours Medication List:   Current Facility-Administered Medications   Medication Dose Route Frequency Provider Last Rate   • acetaminophen  650 mg Oral Q6H PRN Soni Solorio PA-C     • benzonatate  100 mg Oral TID PRN Soni Solorio PA-C     • cefTRIAXone  1,000 mg Intravenous Q24H Soni Solorio PA-C 1,000 mg (05/20/23 0210)   • guaiFENesin  1,200 mg Oral BID Hien MICHAELS MD Nasir     • levalbuterol  1 25 mg Nebulization TID Susi Braxton MD      And   • ipratropium  0 5 mg Nebulization TID Susi Braxton MD     • ipratropium-albuterol  3 mL Nebulization Q4H PRN Rosemary Garcia MD     • sodium chloride  100 mL/hr Intravenous Continuous Velton Junael, PA-C 100 mL/hr (05/20/23 0520)        Today, Patient Was Seen By: Rebeca Davila MD    ** Please Note: Dictation voice to text software may have been used in the creation of this document   ** 5

## 2025-07-11 NOTE — ASU PATIENT PROFILE, ADULT - NSICDXPASTMEDICALHX_GEN_ALL_CORE_FT
PAST MEDICAL HISTORY:  Anemia     Anxiety and depression     Cervical lymphadenopathy     Colon cancer     Endometrial cancer     HTN (hypertension)     Obese     Papillary thyroid carcinoma     Patient on combined chemotherapy and radiation

## 2025-07-14 ENCOUNTER — TRANSCRIPTION ENCOUNTER (OUTPATIENT)
Age: 65
End: 2025-07-14

## 2025-07-14 ENCOUNTER — RESULT REVIEW (OUTPATIENT)
Age: 65
End: 2025-07-14

## 2025-07-14 ENCOUNTER — APPOINTMENT (OUTPATIENT)
Dept: OTOLARYNGOLOGY | Facility: HOSPITAL | Age: 65
End: 2025-07-14

## 2025-07-14 ENCOUNTER — INPATIENT (INPATIENT)
Facility: HOSPITAL | Age: 65
LOS: 0 days | Discharge: HOME | End: 2025-07-15
Attending: OTOLARYNGOLOGY | Admitting: OTOLARYNGOLOGY
Payer: COMMERCIAL

## 2025-07-14 VITALS
TEMPERATURE: 98 F | HEIGHT: 66.75 IN | SYSTOLIC BLOOD PRESSURE: 120 MMHG | DIASTOLIC BLOOD PRESSURE: 69 MMHG | OXYGEN SATURATION: 98 % | RESPIRATION RATE: 14 BRPM | HEART RATE: 56 BPM | WEIGHT: 212.08 LBS

## 2025-07-14 DIAGNOSIS — C73 MALIGNANT NEOPLASM OF THYROID GLAND: ICD-10-CM

## 2025-07-14 DIAGNOSIS — E89.0 POSTPROCEDURAL HYPOTHYROIDISM: Chronic | ICD-10-CM

## 2025-07-14 DIAGNOSIS — Z98.890 OTHER SPECIFIED POSTPROCEDURAL STATES: Chronic | ICD-10-CM

## 2025-07-14 DIAGNOSIS — Z90.49 ACQUIRED ABSENCE OF OTHER SPECIFIED PARTS OF DIGESTIVE TRACT: Chronic | ICD-10-CM

## 2025-07-14 DIAGNOSIS — Z90.710 ACQUIRED ABSENCE OF BOTH CERVIX AND UTERUS: Chronic | ICD-10-CM

## 2025-07-14 LAB
ADD ON TEST-SPECIMEN IN LAB: SIGNIFICANT CHANGE UP
ALBUMIN SERPL ELPH-MCNC: 4.2 G/DL — SIGNIFICANT CHANGE UP (ref 3.3–5)
ALP SERPL-CCNC: 67 U/L — SIGNIFICANT CHANGE UP (ref 40–120)
ALT FLD-CCNC: 32 U/L — SIGNIFICANT CHANGE UP (ref 4–33)
ANION GAP SERPL CALC-SCNC: 18 MMOL/L — HIGH (ref 7–14)
AST SERPL-CCNC: 27 U/L — SIGNIFICANT CHANGE UP (ref 4–32)
BILIRUB SERPL-MCNC: 0.4 MG/DL — SIGNIFICANT CHANGE UP (ref 0.2–1.2)
BUN SERPL-MCNC: 14 MG/DL — SIGNIFICANT CHANGE UP (ref 7–23)
CALCIUM SERPL-MCNC: 8.6 MG/DL — SIGNIFICANT CHANGE UP (ref 8.4–10.5)
CALCIUM SERPL-MCNC: 8.6 MG/DL — SIGNIFICANT CHANGE UP (ref 8.4–10.5)
CHLORIDE SERPL-SCNC: 104 MMOL/L — SIGNIFICANT CHANGE UP (ref 98–107)
CO2 SERPL-SCNC: 18 MMOL/L — LOW (ref 22–31)
CREAT SERPL-MCNC: 0.83 MG/DL — SIGNIFICANT CHANGE UP (ref 0.5–1.3)
EGFR: 78 ML/MIN/1.73M2 — SIGNIFICANT CHANGE UP
EGFR: 78 ML/MIN/1.73M2 — SIGNIFICANT CHANGE UP
GLUCOSE SERPL-MCNC: 226 MG/DL — HIGH (ref 70–99)
MAGNESIUM SERPL-MCNC: 1.7 MG/DL — SIGNIFICANT CHANGE UP (ref 1.6–2.6)
PHOSPHATE SERPL-MCNC: 3.4 MG/DL — SIGNIFICANT CHANGE UP (ref 2.5–4.5)
POTASSIUM SERPL-MCNC: 3.4 MMOL/L — LOW (ref 3.5–5.3)
POTASSIUM SERPL-SCNC: 3.4 MMOL/L — LOW (ref 3.5–5.3)
PROT SERPL-MCNC: 7.1 G/DL — SIGNIFICANT CHANGE UP (ref 6–8.3)
PTH-INTACT FLD-MCNC: 74 PG/ML — HIGH (ref 15–65)
SODIUM SERPL-SCNC: 140 MMOL/L — SIGNIFICANT CHANGE UP (ref 135–145)

## 2025-07-14 PROCEDURE — 38724 REMOVAL OF LYMPH NODES NECK: CPT | Mod: GC,RT

## 2025-07-14 PROCEDURE — 88307 TISSUE EXAM BY PATHOLOGIST: CPT | Mod: 26

## 2025-07-14 PROCEDURE — 60260 REPEAT THYROID SURGERY: CPT | Mod: GC,LT

## 2025-07-14 DEVICE — LIGATING CLIPS WECK HORIZON MEDIUM (BLUE) 24
Type: IMPLANTABLE DEVICE | Site: BILATERAL | Status: NON-FUNCTIONAL
Removed: 2025-07-14

## 2025-07-14 DEVICE — LIGATING CLIPS WECK HORIZON SMALL-WIDE (RED) 24
Type: IMPLANTABLE DEVICE | Site: BILATERAL | Status: NON-FUNCTIONAL
Removed: 2025-07-14

## 2025-07-14 DEVICE — SURGICEL 2 X 14"
Type: IMPLANTABLE DEVICE | Site: BILATERAL | Status: NON-FUNCTIONAL
Removed: 2025-07-14

## 2025-07-14 DEVICE — CARTRIDGE MICROCLIP 30
Type: IMPLANTABLE DEVICE | Site: BILATERAL | Status: NON-FUNCTIONAL
Removed: 2025-07-14

## 2025-07-14 DEVICE — TUBE EMG NIM TRIVANTAGE 7MM
Type: IMPLANTABLE DEVICE | Site: BILATERAL | Status: NON-FUNCTIONAL
Removed: 2025-07-14

## 2025-07-14 RX ORDER — ACETAMINOPHEN 500 MG/5ML
975 LIQUID (ML) ORAL ONCE
Refills: 0 | Status: COMPLETED | OUTPATIENT
Start: 2025-07-14 | End: 2025-07-14

## 2025-07-14 RX ORDER — LOSARTAN POTASSIUM AND HYDROCHLOROTHIAZIDE 12.5; 5 MG/1; MG/1
1 TABLET ORAL
Refills: 0 | DISCHARGE

## 2025-07-14 RX ORDER — LOSARTAN POTASSIUM 100 MG/1
100 TABLET, FILM COATED ORAL DAILY
Refills: 0 | Status: DISCONTINUED | OUTPATIENT
Start: 2025-07-14 | End: 2025-07-14

## 2025-07-14 RX ORDER — GABAPENTIN 400 MG/1
600 CAPSULE ORAL ONCE
Refills: 0 | Status: COMPLETED | OUTPATIENT
Start: 2025-07-14 | End: 2025-07-14

## 2025-07-14 RX ORDER — LEVOTHYROXINE SODIUM 300 MCG
112 TABLET ORAL DAILY
Refills: 0 | Status: DISCONTINUED | OUTPATIENT
Start: 2025-07-14 | End: 2025-07-15

## 2025-07-14 RX ORDER — HYDROMORPHONE/SOD CHLOR,ISO/PF 2 MG/10 ML
0.4 SYRINGE (ML) INJECTION EVERY 4 HOURS
Refills: 0 | Status: DISCONTINUED | OUTPATIENT
Start: 2025-07-14 | End: 2025-07-15

## 2025-07-14 RX ORDER — LEVOTHYROXINE SODIUM 300 MCG
112 TABLET ORAL DAILY
Refills: 0 | Status: DISCONTINUED | OUTPATIENT
Start: 2025-07-14 | End: 2025-07-14

## 2025-07-14 RX ORDER — LEVOTHYROXINE SODIUM 300 MCG
1 TABLET ORAL
Refills: 0 | DISCHARGE

## 2025-07-14 RX ORDER — SODIUM CHLORIDE 9 G/1000ML
1000 INJECTION, SOLUTION INTRAVENOUS
Refills: 0 | Status: DISCONTINUED | OUTPATIENT
Start: 2025-07-14 | End: 2025-07-14

## 2025-07-14 RX ORDER — CEFAZOLIN SODIUM IN 0.9 % NACL 3 G/100 ML
INTRAVENOUS SOLUTION, PIGGYBACK (ML) INTRAVENOUS
Refills: 0 | Status: DISCONTINUED | OUTPATIENT
Start: 2025-07-14 | End: 2025-07-15

## 2025-07-14 RX ORDER — CEFAZOLIN SODIUM IN 0.9 % NACL 3 G/100 ML
500 INTRAVENOUS SOLUTION, PIGGYBACK (ML) INTRAVENOUS EVERY 8 HOURS
Refills: 0 | Status: DISCONTINUED | OUTPATIENT
Start: 2025-07-14 | End: 2025-07-14

## 2025-07-14 RX ORDER — HEPARIN SODIUM 1000 [USP'U]/ML
5000 INJECTION INTRAVENOUS; SUBCUTANEOUS EVERY 12 HOURS
Refills: 0 | Status: DISCONTINUED | OUTPATIENT
Start: 2025-07-15 | End: 2025-07-15

## 2025-07-14 RX ORDER — ACETAMINOPHEN 500 MG/5ML
1000 LIQUID (ML) ORAL EVERY 8 HOURS
Refills: 0 | Status: DISCONTINUED | OUTPATIENT
Start: 2025-07-14 | End: 2025-07-15

## 2025-07-14 RX ORDER — CEFAZOLIN SODIUM IN 0.9 % NACL 3 G/100 ML
2000 INTRAVENOUS SOLUTION, PIGGYBACK (ML) INTRAVENOUS ONCE
Refills: 0 | Status: COMPLETED | OUTPATIENT
Start: 2025-07-14 | End: 2025-07-14

## 2025-07-14 RX ORDER — CEFAZOLIN SODIUM IN 0.9 % NACL 3 G/100 ML
2000 INTRAVENOUS SOLUTION, PIGGYBACK (ML) INTRAVENOUS EVERY 8 HOURS
Refills: 0 | Status: DISCONTINUED | OUTPATIENT
Start: 2025-07-14 | End: 2025-07-15

## 2025-07-14 RX ORDER — APREPITANT 40 MG/1
40 CAPSULE ORAL ONCE
Refills: 0 | Status: COMPLETED | OUTPATIENT
Start: 2025-07-14 | End: 2025-07-14

## 2025-07-14 RX ORDER — ACETAMINOPHEN 500 MG/5ML
1000 LIQUID (ML) ORAL ONCE
Refills: 0 | Status: DISCONTINUED | OUTPATIENT
Start: 2025-07-14 | End: 2025-07-14

## 2025-07-14 RX ADMIN — Medication 20 MILLIEQUIVALENT(S): at 22:03

## 2025-07-14 RX ADMIN — GABAPENTIN 600 MILLIGRAM(S): 400 CAPSULE ORAL at 09:47

## 2025-07-14 RX ADMIN — Medication 3 MILLILITER(S): at 21:44

## 2025-07-14 RX ADMIN — APREPITANT 40 MILLIGRAM(S): 40 CAPSULE ORAL at 09:48

## 2025-07-14 RX ADMIN — Medication 975 MILLIGRAM(S): at 09:49

## 2025-07-14 RX ADMIN — SODIUM CHLORIDE 150 MILLILITER(S): 9 INJECTION, SOLUTION INTRAVENOUS at 17:04

## 2025-07-14 RX ADMIN — Medication 100 MILLIGRAM(S): at 19:51

## 2025-07-14 RX ADMIN — Medication 400 MILLIGRAM(S): at 22:05

## 2025-07-14 RX ADMIN — Medication 15 MILLILITER(S): at 09:48

## 2025-07-14 NOTE — BRIEF OPERATIVE NOTE - NSICDXBRIEFPROCEDURE_GEN_ALL_CORE_FT
PROCEDURES:  Completion thyroidectomy 14-Jul-2025 15:56:48  Melissa Jacinto  Lobectomy, thyroid, total, with isthmusectomy 14-Jul-2025 15:57:37  Melissa Jacinto

## 2025-07-14 NOTE — BRIEF OPERATIVE NOTE - NSICDXBRIEFPOSTOP_GEN_ALL_CORE_FT
POST-OP DIAGNOSIS:  Papillary thyroid carcinoma 14-Jul-2025 15:58:05  Melissa Jacinto  S/P thyroidectomy 14-Jul-2025 15:58:14  Melissa Jacinto

## 2025-07-14 NOTE — PRE-OP CHECKLIST - PATIENT SENT TO
operating room Mercedes Flap Text: The defect edges were debeveled with a #15 scalpel blade.  Given the location of the defect, shape of the defect and the proximity to free margins a Mercedes flap was deemed most appropriate.  Using a sterile surgical marker, an appropriate advancement flap was drawn incorporating the defect and placing the expected incisions within the relaxed skin tension lines where possible. The area thus outlined was incised deep to adipose tissue with a #15 scalpel blade.  The skin margins were undermined to an appropriate distance in all directions utilizing iris scissors.

## 2025-07-14 NOTE — CHART NOTE - NSCHARTNOTEFT_GEN_A_CORE
Pt seen and examined at bedside. doing well post-opratively, pain well controlled.    LARYNGOSCOPY EXAM:     Verbal consent was obtained from patient prior to procedure.    Flexible laryngoscopy was performed and revealed the following:    Nasopharynx had no mass or exudate.    Base of tongue was symmetric and not enlarged.    Vallecula was clear    Epiglottis, both aryepiglottic folds and both false vocal folds were normal    Arytenoids both without edema and erythema     True vocal folds were fully mobile and without lesions.     Post cricoid area was clear    Interarytenoid edema was absent    The patient tolerated the procedure well.

## 2025-07-15 ENCOUNTER — TRANSCRIPTION ENCOUNTER (OUTPATIENT)
Age: 65
End: 2025-07-15

## 2025-07-15 VITALS
RESPIRATION RATE: 19 BRPM | OXYGEN SATURATION: 95 % | HEART RATE: 78 BPM | DIASTOLIC BLOOD PRESSURE: 68 MMHG | TEMPERATURE: 98 F | SYSTOLIC BLOOD PRESSURE: 121 MMHG

## 2025-07-15 LAB
ANION GAP SERPL CALC-SCNC: 17 MMOL/L — HIGH (ref 7–14)
BUN SERPL-MCNC: 14 MG/DL — SIGNIFICANT CHANGE UP (ref 7–23)
CALCIUM SERPL-MCNC: 9.1 MG/DL — SIGNIFICANT CHANGE UP (ref 8.4–10.5)
CHLORIDE SERPL-SCNC: 103 MMOL/L — SIGNIFICANT CHANGE UP (ref 98–107)
CO2 SERPL-SCNC: 20 MMOL/L — LOW (ref 22–31)
CREAT SERPL-MCNC: 0.77 MG/DL — SIGNIFICANT CHANGE UP (ref 0.5–1.3)
EGFR: 86 ML/MIN/1.73M2 — SIGNIFICANT CHANGE UP
EGFR: 86 ML/MIN/1.73M2 — SIGNIFICANT CHANGE UP
GLUCOSE SERPL-MCNC: 134 MG/DL — HIGH (ref 70–99)
HCT VFR BLD CALC: 40.9 % — SIGNIFICANT CHANGE UP (ref 34.5–45)
HGB BLD-MCNC: 13 G/DL — SIGNIFICANT CHANGE UP (ref 11.5–15.5)
MAGNESIUM SERPL-MCNC: 1.8 MG/DL — SIGNIFICANT CHANGE UP (ref 1.6–2.6)
MCHC RBC-ENTMCNC: 28.3 PG — SIGNIFICANT CHANGE UP (ref 27–34)
MCHC RBC-ENTMCNC: 31.8 G/DL — LOW (ref 32–36)
MCV RBC AUTO: 88.9 FL — SIGNIFICANT CHANGE UP (ref 80–100)
NRBC # BLD AUTO: 0 K/UL — SIGNIFICANT CHANGE UP (ref 0–0)
NRBC # FLD: 0 K/UL — SIGNIFICANT CHANGE UP (ref 0–0)
NRBC BLD AUTO-RTO: 0 /100 WBCS — SIGNIFICANT CHANGE UP (ref 0–0)
PHOSPHATE SERPL-MCNC: 2.3 MG/DL — LOW (ref 2.5–4.5)
PLATELET # BLD AUTO: 131 K/UL — LOW (ref 150–400)
PMV BLD: 11.3 FL — SIGNIFICANT CHANGE UP (ref 7–13)
POTASSIUM SERPL-MCNC: 4.1 MMOL/L — SIGNIFICANT CHANGE UP (ref 3.5–5.3)
POTASSIUM SERPL-SCNC: 4.1 MMOL/L — SIGNIFICANT CHANGE UP (ref 3.5–5.3)
RBC # BLD: 4.6 M/UL — SIGNIFICANT CHANGE UP (ref 3.8–5.2)
RBC # FLD: 14.8 % — HIGH (ref 10.3–14.5)
SODIUM SERPL-SCNC: 140 MMOL/L — SIGNIFICANT CHANGE UP (ref 135–145)
WBC # BLD: 14.65 K/UL — HIGH (ref 3.8–10.5)
WBC # FLD AUTO: 14.65 K/UL — HIGH (ref 3.8–10.5)

## 2025-07-15 RX ORDER — LOSARTAN POTASSIUM 100 MG/1
100 TABLET, FILM COATED ORAL DAILY
Refills: 0 | Status: DISCONTINUED | OUTPATIENT
Start: 2025-07-15 | End: 2025-07-15

## 2025-07-15 RX ORDER — CEPHALEXIN 250 MG/1
500 CAPSULE ORAL EVERY 12 HOURS
Refills: 0 | Status: DISCONTINUED | OUTPATIENT
Start: 2025-07-15 | End: 2025-07-15

## 2025-07-15 RX ORDER — MAGNESIUM OXIDE 400 MG
400 TABLET ORAL ONCE
Refills: 0 | Status: COMPLETED | OUTPATIENT
Start: 2025-07-15 | End: 2025-07-15

## 2025-07-15 RX ORDER — SOD PHOS DI, MONO/K PHOS MONO 250 MG
1 TABLET ORAL ONCE
Refills: 0 | Status: COMPLETED | OUTPATIENT
Start: 2025-07-15 | End: 2025-07-15

## 2025-07-15 RX ORDER — CEPHALEXIN 250 MG/1
1 CAPSULE ORAL
Qty: 6 | Refills: 0
Start: 2025-07-15 | End: 2025-07-17

## 2025-07-15 RX ADMIN — Medication 3 MILLILITER(S): at 07:50

## 2025-07-15 RX ADMIN — Medication 1 PACKET(S): at 10:24

## 2025-07-15 RX ADMIN — Medication 400 MILLIGRAM(S): at 04:52

## 2025-07-15 RX ADMIN — Medication 100 MILLIGRAM(S): at 04:46

## 2025-07-15 RX ADMIN — Medication 20 MILLIEQUIVALENT(S): at 00:26

## 2025-07-15 RX ADMIN — HEPARIN SODIUM 5000 UNIT(S): 1000 INJECTION INTRAVENOUS; SUBCUTANEOUS at 04:53

## 2025-07-15 RX ADMIN — Medication 400 MILLIGRAM(S): at 10:24

## 2025-07-15 RX ADMIN — Medication 112 MICROGRAM(S): at 04:45

## 2025-07-15 RX ADMIN — Medication 20 MILLIEQUIVALENT(S): at 02:23

## 2025-07-15 RX ADMIN — Medication 1000 MILLIGRAM(S): at 05:20

## 2025-07-15 RX ADMIN — LOSARTAN POTASSIUM 100 MILLIGRAM(S): 100 TABLET, FILM COATED ORAL at 11:57

## 2025-07-15 NOTE — PROGRESS NOTE ADULT - SUBJECTIVE AND OBJECTIVE BOX
OTOLARYNGOLOGY (ENT) PROGRESS NOTE     Interval:  - Pt seen and examined at bedside. PM Ca 8.6 PTH 74, started on FLD, scope with good VF motion, passed TOV                     Objective:    Vital Signs:  T(C): 36.7 (07-15-25 @ 01:33), Max: 37.7 (07-14-25 @ 16:20)  HR: 84 (07-15-25 @ 01:33) (56 - 115)  BP: 117/70 (07-15-25 @ 01:33) (117/70 - 149/90)  RR: 18 (07-15-25 @ 01:33) (11 - 21)  SpO2: 97% (07-15-25 @ 01:33) (93% - 100%)    General: NAD, A+Ox3  Respiratory: No respiratory distress, stridor, or stertor  Voice quality: normal  Face:  Symmetric without masses or lesions  OU: EOMI  Right: Pinna wnl  Left: Pinna wnl  Nose: nasal cavity clear bilaterally, inferior turbinates normal, mucosa normal without crusting or bleeding  OC/OP: tongue normal, floor of mouth WNL, no masses or lesions, OP clear,   Neck: soft/flat, no LAD, incision site c/d/i  Neuro: CNII-XII grossly intact    LABORATORY:   07-14    140  |  104  |  14  ----------------------------<  226[H]  3.4[L]   |  18[L]  |  0.83    Ca    8.6      14 Jul 2025 19:45  Phos  3.4     07-14  Mg     1.70     07-14    TPro  7.1  /  Alb  4.2  /  TBili  0.4  /  DBili  x   /  AST  27  /  ALT  32  /  AlkPhos  67  07-14   [id number]    MICROBIOLOGY:      I&O:    07-14-25 @ 07:01  -  07-15-25 @ 06:39  --------------------------------------------------------  IN: 1000 mL / OUT: 626 mL / NET: 374 mL         IMAGING:     MEDICATIONS:  acetaminophen   IVPB .. 1000 milliGRAM(s) IV Intermittent every 8 hours  ceFAZolin   IVPB      ceFAZolin   IVPB 2000 milliGRAM(s) IV Intermittent every 8 hours  heparin   Injectable 5000 Unit(s) SubCutaneous every 12 hours  HYDROmorphone  Injectable 0.4 milliGRAM(s) IV Push every 4 hours PRN  levothyroxine 112 MICROGram(s) Oral daily  sodium chloride 0.9% lock flush 3 milliLiter(s) IV Push every 8 hours

## 2025-07-15 NOTE — DISCHARGE NOTE NURSING/CASE MANAGEMENT/SOCIAL WORK - FINANCIAL ASSISTANCE
[facility] provides services at a reduced cost to those who are determined to be eligible through [facility]’s financial assistance program. Information regarding [facility]’s financial assistance program can be found by going to [email or web address] or by calling [phone].

## 2025-07-15 NOTE — PROGRESS NOTE ADULT - ASSESSMENT
64yF s/p L completion thyroidectomy, R II-IV SND.     Plan:  - advance to regular diet this AM  - start synthroid  - pain/nausea control

## 2025-07-15 NOTE — DISCHARGE NOTE PROVIDER - HOSPITAL COURSE
64 year old female with a PMHX of HTN, endometrial cancer and papillary thyroid carcinoma S/P left completion of total thyroidectomy and right selective neck dissection level II-IV on 7/14/2025. Had DIANA drains that were monitored for output to prevent seroma formation. DIANA drains were removed prior to discharge. Calcium and PTH levels are monitored post-operatively, stable. Postoperative laryngoscopy with patent airway and mobile bilateral true vocal cords, started on full liquid diet, tolerating well. Advanced to regular PO diet without issue, pain is controlled. Synthroid started. Patient was cleared for discharge home By Dr. Murray on 7/15/2025. All prescriptions were sent to a pharmacy that was agreed on with the patient.   64 year old female with a PMHX of HTN, endometrial cancer and papillary thyroid carcinoma S/P left completion of total thyroidectomy and right selective neck dissection level II-IV on 7/14/2025. Had DIANA drains that were monitored for output to prevent seroma formation. DIANA drains teaching were done prior to discharge. Calcium and PTH levels are monitored post-operatively, stable. Postoperative laryngoscopy with patent airway and mobile bilateral true vocal cords, started on full liquid diet, tolerating well. Advanced to regular PO diet without issue, pain is controlled. Synthroid started. Patient was cleared for discharge home By Dr. Murray on 7/15/2025. All prescriptions were sent to a pharmacy that was agreed on with the patient.

## 2025-07-15 NOTE — DISCHARGE NOTE NURSING/CASE MANAGEMENT/SOCIAL WORK - PATIENT PORTAL LINK FT
You can access the FollowMyHealth Patient Portal offered by [facility] by registering at the following website: [email or web address]. By joining [employer]’s FollowMyHealth portal, you will also be able to view your health information using other applications (apps) compatible with our system.

## 2025-07-15 NOTE — DISCHARGE NOTE PROVIDER - NSDCMRMEDTOKEN_GEN_ALL_CORE_FT
levothyroxine 112 mcg (0.112 mg) oral tablet: 1 tab(s) orally once a day  losartan-hydrochlorothiazide 100 mg-12.5 mg oral tablet: 1 tab(s) orally once a day  Vitamin D 4000 International Units: once a day   cephalexin 500 mg oral capsule: 1 cap(s) orally every 12 hours  levothyroxine 112 mcg (0.112 mg) oral tablet: 1 tab(s) orally once a day  losartan-hydrochlorothiazide 100 mg-12.5 mg oral tablet: 1 tab(s) orally once a day  Vitamin D 4000 International Units: once a day

## 2025-07-15 NOTE — DISCHARGE NOTE NURSING/CASE MANAGEMENT/SOCIAL WORK - NSDCPEFALRISK_GEN_ALL_CORE
For information on Fall & Injury Prevention, visit: [email or web address] OR  [email or web address] OR  https://www.cdc.gov/steadi/patient.html

## 2025-07-15 NOTE — CONSULT NOTE ADULT - ASSESSMENT
64 year old female with history of rectal and endometrial cancer , previously treated in 2022 ( [facility]) presenting for scheduled thyroidectomy        Rectal cancer  --undergoing care with Dr Levine of [facility]  --rectal cancer diagnosed in  9/19/22   --was treated with Concurrent Xeloda with radiation 11/29/22­1/9/23. FOLFOX x 6 cycles 1/2023­4/2023.  LAR/Radical hysterectomy 6/13/23  --CT a/p 3/13/25 No new lung mass or suspicious pulmonary nodule. No new lesion suspicious for metastasis identified in abdomen or pelvis. Suboptimally distended with mild circumferential wall thickening and perivesical stranding.   --will follow up with Dr Levine after discharge      Papillary thyroid carcinoma  --s/p Completion thyroidectomy 14-Jul-2025  Lobectomy, thyroid, total, with isthmusectomy, await final pathology  --discharge planning home today, to follow with endocrinology and Dr Levine of [facility]      Jerilyn Ribera NP  Hematology/Oncology  [facility]  [phone] (Office)  [phone] (Alt office)  Evenings and weekends please call MD on call or office   64 year old female with history of rectal and endometrial cancer , previously treated in 2022 ( [facility]) presenting for scheduled thyroidectomy    Rectal cancer  --undergoing care with Dr Levine of [facility]  --rectal cancer diagnosed in  9/19/22   --was treated with Concurrent Xeloda with radiation 11/29/22­1/9/23. FOLFOX x 6 cycles 1/2023­4/2023.  LAR/Radical hysterectomy 6/13/23  --CT a/p 3/13/25 No new lung mass or suspicious pulmonary nodule. No new lesion suspicious for metastasis identified in abdomen or pelvis. Suboptimally distended with mild circumferential wall thickening and perivesical stranding.   --will follow up with Dr Levine after discharge      Papillary thyroid carcinoma  --s/p Completion thyroidectomy 14-Jul-2025  Lobectomy, thyroid, total, with isthmusectomy, await final pathology  --discharge planning home today, to follow with endocrinology and Dr Levine of [facility]      Jerilyn Ribera NP  Hematology/Oncology  [facility]  [phone] (Office)  [phone] (Alt office)  Evenings and weekends please call MD on call or office

## 2025-07-15 NOTE — DISCHARGE NOTE PROVIDER - NSDCFUADDINST_GEN_ALL_CORE_FT
Wound Care: Keep the incision site clean and dry, do not get wet for at least 48 hours (2 days). Can shower after 48 hours. Let water run over incision site, pat dry, do not scrub.   Keep steri strips (white stickers) to remain in place, will fall off on there own  Pain Control: OTC Tylenol 650mg every 6 hours as needed for mild to moderate pain. Do not take more than 4000mg of medication in 24 hour period. Oxycodone every 6 hours as needed for severe pain. Take OTC  stool softener while taking oxycodone as can cause constipation  Activity: No heavy lifting or strenuous exercise for 2-4 weeks.   Diet: no restrictions, advance as tolerated.  Follow up with Dr. Murray as scheduled Wound Care: Keep the incision site clean and dry, do not get wet for at least 48 hours (2 days). Can shower after 48 hours. Let water run over incision site, pat dry, do not scrub.   Keep steri strips (white stickers) to remain in place, will fall off on there own  Antibiotic: Keflex 500BID for 3 days while DIANA drain is in place  Pain Control: OTC Tylenol 650mg every 6 hours as needed for mild to moderate pain. Do not take more than 4000mg of medication in 24 hour period. Oxycodone every 6 hours as needed for severe pain. Take OTC  stool softener while taking oxycodone as can cause constipation  Activity: No heavy lifting or strenuous exercise for 2-4 weeks.   Diet: no restrictions, advance as tolerated.  Follow up with Dr. Murray as scheduled    DRAIN CARE INSTRUCTIONS  Empty the bulb when it is half full or every 12 hrs.  To empty drain:    1. Wash your hands with soap and water.   2. Remove the plug from the bulb.   3. Pour the fluid into a measuring cup.   4. Clean the plug with an alcohol swab or a cotton ball dipped in rubbing alcohol.   5. Squeeze the bulb flat and put the plug back in. The bulb should stay flat until it starts to fill with fluid again.  6. Measure the amount of fluid you pour out. Write down how much fluid you empty from the DIANA drain and the date and time you collected it.  7. Flush the fluid down the toilet. Wash your hands.  Monitor drain site where it comes out of the skin for any evidence of redness or purulent drainage. Call your doctor immediately if the drain comes out for any reason.   Wound Care: Keep the incision site clean and dry, do not get wet for at least 48 hours (2 days). Can shower after 48 hours. Let water run over incision site, pat dry, do not scrub.   Keep steri strips (white stickers) to remain in place, will fall off on there own  Antibiotic: Keflex 500 two times a day for 3 days while DIANA drain is in place  Pain Control: OTC Tylenol 650mg every 6 hours as needed for mild to moderate pain. Do not take more than 4000mg of medication in 24 hour period.  Activity: No heavy lifting or strenuous exercise for 2-4 weeks.   Diet: no restrictions, advance as tolerated.  Follow up with Dr. Murray as scheduled    DRAIN CARE INSTRUCTIONS  Empty the bulb when it is half full or every 12 hrs.  To empty drain:    1. Wash your hands with soap and water.   2. Remove the plug from the bulb.   3. Pour the fluid into a measuring cup.   4. Clean the plug with an alcohol swab or a cotton ball dipped in rubbing alcohol.   5. Squeeze the bulb flat and put the plug back in. The bulb should stay flat until it starts to fill with fluid again.  6. Measure the amount of fluid you pour out. Write down how much fluid you empty from the DIANA drain and the date and time you collected it.  7. Flush the fluid down the toilet. Wash your hands.  Monitor drain site where it comes out of the skin for any evidence of redness or purulent drainage. Call your doctor immediately if the drain comes out for any reason.

## 2025-07-15 NOTE — DISCHARGE NOTE PROVIDER - NSDCFUSCHEDAPPT_GEN_ALL_CORE_FT
[facility]ician [facility]  OTOLARYNG [address]  Scheduled Appointment: 07/23/2025    Anjelica Rivas  [facility]ician [facility]  ENDOCRIN [address]  Scheduled Appointment: 10/10/2025

## 2025-07-15 NOTE — DISCHARGE NOTE PROVIDER - NSDCCPCAREPLAN_GEN_ALL_CORE_FT
PRINCIPAL DISCHARGE DIAGNOSIS  Diagnosis: Papillary thyroid carcinoma  Assessment and Plan of Treatment: - Please follow up with Dr. Murray outpatient as scheduled

## 2025-07-15 NOTE — DISCHARGE NOTE PROVIDER - CARE PROVIDER_API CALL
1.[address] Edmundo Murray  Otolaryngology Plastic Surgery [facility]  [address] [place] [address]  Phone: [phone]  Fax: [phone]  Follow Up Time:

## 2025-07-15 NOTE — CONSULT NOTE ADULT - NS ATTEND AMEND GEN_ALL_CORE FT
63 y/o F who follows with Dr. Levine in our practice for a history of rectal and endometrial cancer (JM) and more recently papillary thyroid cancer, who presented for scheduled thyroidectomy. Thyroidectomy completed yesterday, doing well post-op. D/c planning. Follow-up outpatient with Dr. Levine to review pathology.

## 2025-07-15 NOTE — PATIENT PROFILE ADULT - FALL HARM RISK - HARM RISK INTERVENTIONS

## 2025-07-15 NOTE — CONSULT NOTE ADULT - SUBJECTIVE AND OBJECTIVE BOX
Reason for consult: h/o colon cancer, newly dx papillary thyroid ca    HPI:  Pt. is a 63 yo female with a PMHX of papillary thyroid carcinoma, colon cancer, endometrial cancer, and HTN.  Pt. presents to PST for malignant neoplasm of thyroid gland to be evaluated for a left thyroidectomy, right neck dissection.       Surgeon's Note 5/14/25: "63yo female who is being referred by Dr. Rivas for neck lymph node. Reports lymph node was found enlarged on recent sonogram done last month. recent Rx colon CA, RT and chemo, then surgery 6/23. endometrial CA found at same time. No addl Rx after surgery.  hx. Right thyroid lobectomy with isthmusectomy on 8/5/2022 for PTCA, final path 2.9cm ptca  Has been under surveillance for Left remnant after the case was discussed in [facility].  Denies pain, dysphagia, dysphonia and or dyspnea  4/29/2025 FNA right cervical lymph node-involved by metastatic carcinoma with papillary architecture and focal psammoma body. Flow Cytometry-no immunophenotypic evidence of lymphoproliferative disorder  4/2/20025 Thyroid US reporting sp right sided hemithyroidectomy. heterogeneous left thyroid lobe without discrete nodule. Non specific atypical appearing right sided level 3 cervical lymph node measuring 1.9cm which demonstrated a somewhat diminished fatty hilium."  4/4/2025 TSH 2.36 thyroglobulin 2.3 thyroglobulin antibodies <1 Thyroglobulin Peroxidase <1 On Levothyroxine 112mcg daily  2022 treated for colon cancer and endometrial cancer   (03 Jul 2025 10:38)      Oncology consultation completed for this 64 year old female with history of colon cancer/endometrial ca and newly diagnosed papillary thyroid ca known to [facility] and follows with Dr Levine for ongoing surveillance presenting    PAST MEDICAL & SURGICAL HISTORY:  HTN (hypertension)      Anxiety and depression      Colon cancer      Endometrial cancer      Patient on combined chemotherapy and radiation      Anemia      Obese      Cervical lymphadenopathy      Papillary thyroid carcinoma      History of cholecystectomy  1986      History of colon resection      S/P MAK-BSO (total abdominal hysterectomy and bilateral salpingo-oophorectomy)      History of closure of ileostomy      H/O partial thyroidectomy          FAMILY HISTORY:  FH: lung cancer (Mother)    FH: ovarian cancer (Mother)    FH: heart disease (Father)    FH: HTN (hypertension) (Mother)    FH: HTN (hypertension) (Father)    FH: stomach cancer (Sibling)    Family history of pacemaker (Father)        Alochol: Denied  Smoking: Nonsmoker  Drug Use: Denied  Marital Status:         Allergies    codeine (Other)  erythromycin (Other)    Intolerances        MEDICATIONS  (STANDING):  acetaminophen   IVPB .. 1000 milliGRAM(s) IV Intermittent every 8 hours  cephalexin 500 milliGRAM(s) Oral every 12 hours  heparin   Injectable 5000 Unit(s) SubCutaneous every 12 hours  hydrochlorothiazide 12.5 milliGRAM(s) Oral daily  levothyroxine 112 MICROGram(s) Oral daily  losartan 100 milliGRAM(s) Oral daily    MEDICATIONS  (PRN):  HYDROmorphone  Injectable 0.4 milliGRAM(s) IV Push every 4 hours PRN Severe Pain (7 - 10)      ROS  No fever, sweats, chills  No epistaxis, HA, sore throat  No CP, SOB, cough, sputum  No n/v/d, abd pain, melena, hematochezia  No edema  No rash  No anxiety  No back pain, joint pain  No bleeding, bruising  No dysuria, hematuria    T(C): 36.6 (07-15-25 @ 09:00), Max: 37.7 (07-14-25 @ 16:20)  HR: 78 (07-15-25 @ 09:00) (64 - 115)  BP: 121/68 (07-15-25 @ 09:00) (117/70 - 149/90)  RR: 19 (07-15-25 @ 09:00) (11 - 21)  SpO2: 95% (07-15-25 @ 09:00) (93% - 100%)  Wt(kg): --    PE  NAD  Awake, alert  Anicteric, MMM  RRR  CTAB  Abd soft, NT, ND  No c/c/e  No rash grossly  FROM                          13.0   14.65 )-----------( 131      ( 15 Jul 2025 07:08 )             40.9       07-15    140  |  103  |  14  ----------------------------<  134[H]  4.1   |  20[L]  |  0.77    Ca    9.1      15 Jul 2025 07:08  Phos  2.3     07-15  Mg     1.80     07-15    TPro  7.1  /  Alb  4.2  /  TBili  0.4  /  DBili  x   /  AST  27  /  ALT  32  /  AlkPhos  67  07-14   Reason for consult: h/o colon cancer, newly dx papillary thyroid ca    HPI:  Pt. is a 65 yo female with a PMHX of papillary thyroid carcinoma, colon cancer, endometrial cancer, and HTN.  Pt. presents to PST for malignant neoplasm of thyroid gland to be evaluated for a left thyroidectomy, right neck dissection.       Surgeon's Note 5/14/25: "65yo female who is being referred by Dr. Rivas for neck lymph node. Reports lymph node was found enlarged on recent sonogram done last month. recent Rx colon CA, RT and chemo, then surgery 6/23. endometrial CA found at same time. No addl Rx after surgery.  hx. Right thyroid lobectomy with isthmusectomy on 8/5/2022 for PTCA, final path 2.9cm ptca  Has been under surveillance for Left remnant after the case was discussed in [facility].  Denies pain, dysphagia, dysphonia and or dyspnea  4/29/2025 FNA right cervical lymph node-involved by metastatic carcinoma with papillary architecture and focal psammoma body. Flow Cytometry-no immunophenotypic evidence of lymphoproliferative disorder  4/2/20025 Thyroid US reporting sp right sided hemithyroidectomy. heterogeneous left thyroid lobe without discrete nodule. Non specific atypical appearing right sided level 3 cervical lymph node measuring 1.9cm which demonstrated a somewhat diminished fatty hilium."  4/4/2025 TSH 2.36 thyroglobulin 2.3 thyroglobulin antibodies <1 Thyroglobulin Peroxidase <1 On Levothyroxine 112mcg daily  2022 treated for colon cancer and endometrial cancer   (03 Jul 2025 10:38)      Oncology consultation completed for this 64 year old female with history of colon cancer/endometrial ca and newly diagnosed papillary thyroid ca known to [facility] and follows with Dr Levine for ongoing surveillance presenting to [facility] for a planned Thyroidectomy.  Her oncologic history is as follows:   65 y/o female with stage I papillary thyroid cancer (resected 8/2022). She was found to have rectal cancer on 9/19/2022.   MRI shows T4b rectal cancer involving cervix. CT shows suspicious sub­centimeter pulmonary nodules. CEA 28 (10/4/22). Deeper  biopsy shows adenocarcinoma. Proficient MMR.   Treatment History:  Concurrent Xeloda with radiation 11/29/22­1/9/23.  FOLFOX x 6 cycles 1/2023­4/2023  LAR/Radical hysterectomy 6/13/23  Pathology report reviewed in detail.   [id number] ­ 0 of 8 lymph nodes identified. Treatment effect was noted +LVI.   She was also found to have endometrioid type endometrial cancer T1a ­ incidentally. No myometrial invasion was identified.   Additional therapy is not indicated after receiving total neoadjuvant therapy.   Invitae negative for [facility].       PAST MEDICAL & SURGICAL HISTORY:  HTN (hypertension)      Anxiety and depression      Colon cancer      Endometrial cancer      Patient on combined chemotherapy and radiation      Anemia      Obese      Cervical lymphadenopathy      Papillary thyroid carcinoma      History of cholecystectomy  1986      History of colon resection      S/P MAK-BSO (total abdominal hysterectomy and bilateral salpingo-oophorectomy)      History of closure of ileostomy      H/O partial thyroidectomy          FAMILY HISTORY:  FH: lung cancer (Mother)    FH: ovarian cancer (Mother)    FH: heart disease (Father)    FH: HTN (hypertension) (Mother)    FH: HTN (hypertension) (Father)    FH: stomach cancer (Sibling)    Family history of pacemaker (Father)        Alochol: Denied  Smoking: Nonsmoker  Drug Use: Denied  Marital Status:         Allergies    codeine (Other)  erythromycin (Other)    Intolerances        MEDICATIONS  (STANDING):  acetaminophen   IVPB .. 1000 milliGRAM(s) IV Intermittent every 8 hours  cephalexin 500 milliGRAM(s) Oral every 12 hours  heparin   Injectable 5000 Unit(s) SubCutaneous every 12 hours  hydrochlorothiazide 12.5 milliGRAM(s) Oral daily  levothyroxine 112 MICROGram(s) Oral daily  losartan 100 milliGRAM(s) Oral daily    MEDICATIONS  (PRN):  HYDROmorphone  Injectable 0.4 milliGRAM(s) IV Push every 4 hours PRN Severe Pain (7 - 10)      ROS  No fever, sweats, chills  No epistaxis, HA, sore throat  No CP, SOB, cough, sputum  No n/v/d, abd pain, melena, hematochezia  No edema  No rash  No anxiety  No back pain, joint pain  No bleeding, bruising  No dysuria, hematuria    T(C): 36.6 (07-15-25 @ 09:00), Max: 37.7 (07-14-25 @ 16:20)  HR: 78 (07-15-25 @ 09:00) (64 - 115)  BP: 121/68 (07-15-25 @ 09:00) (117/70 - 149/90)  RR: 19 (07-15-25 @ 09:00) (11 - 21)  SpO2: 95% (07-15-25 @ 09:00) (93% - 100%)  Wt(kg): --    PE  NAD  Awake, alert  Anicteric, MMM  RRR  CTAB  Abd soft, NT, ND  No c/c/e  No rash grossly                            13.0   14.65 )-----------( 131      ( 15 Jul 2025 07:08 )             40.9       07-15    140  |  103  |  14  ----------------------------<  134[H]  4.1   |  20[L]  |  0.77    Ca    9.1      15 Jul 2025 07:08  Phos  2.3     07-15  Mg     1.80     07-15    TPro  7.1  /  Alb  4.2  /  TBili  0.4  /  DBili  x   /  AST  27  /  ALT  32  /  AlkPhos  67  07-14

## 2025-07-16 ENCOUNTER — APPOINTMENT (OUTPATIENT)
Dept: OTOLARYNGOLOGY | Facility: CLINIC | Age: 65
End: 2025-07-16
Payer: COMMERCIAL

## 2025-07-16 PROBLEM — C73 MALIGNANT NEOPLASM OF THYROID GLAND: Chronic | Status: ACTIVE | Noted: 2025-07-03

## 2025-07-16 PROBLEM — D64.9 ANEMIA, UNSPECIFIED: Chronic | Status: ACTIVE | Noted: 2025-07-03

## 2025-07-16 PROCEDURE — 99024 POSTOP FOLLOW-UP VISIT: CPT

## 2025-07-18 LAB — SURGICAL PATHOLOGY STUDY: SIGNIFICANT CHANGE UP

## 2025-07-23 ENCOUNTER — APPOINTMENT (OUTPATIENT)
Dept: OTOLARYNGOLOGY | Facility: CLINIC | Age: 65
End: 2025-07-23
Payer: COMMERCIAL

## 2025-07-23 DIAGNOSIS — E04.1 NONTOXIC SINGLE THYROID NODULE: ICD-10-CM

## 2025-07-23 PROBLEM — C18.9 MALIGNANT NEOPLASM OF COLON, UNSPECIFIED: Chronic | Status: ACTIVE | Noted: 2025-07-03

## 2025-07-23 PROBLEM — C54.1 MALIGNANT NEOPLASM OF ENDOMETRIUM: Chronic | Status: ACTIVE | Noted: 2025-07-03

## 2025-07-23 PROBLEM — R59.0 LOCALIZED ENLARGED LYMPH NODES: Chronic | Status: ACTIVE | Noted: 2025-07-03

## 2025-07-23 PROBLEM — E66.9 OBESITY, UNSPECIFIED: Chronic | Status: ACTIVE | Noted: 2025-07-03

## 2025-07-23 PROBLEM — Z79.69 LONG TERM (CURRENT) USE OF OTHER IMMUNOMODULATORS AND IMMUNOSUPPRESSANTS: Chronic | Status: ACTIVE | Noted: 2025-07-03

## 2025-07-23 PROCEDURE — 99024 POSTOP FOLLOW-UP VISIT: CPT

## 2025-08-06 DIAGNOSIS — C77.0 SECONDARY AND UNSPECIFIED MALIGNANT NEOPLASM OF LYMPH NODES OF HEAD, FACE AND NECK: ICD-10-CM

## 2025-08-20 DIAGNOSIS — C73 MALIGNANT NEOPLASM OF THYROID GLAND: ICD-10-CM

## 2025-09-03 ENCOUNTER — APPOINTMENT (OUTPATIENT)
Dept: OTOLARYNGOLOGY | Facility: CLINIC | Age: 65
End: 2025-09-03
Payer: COMMERCIAL

## 2025-09-03 PROCEDURE — 99024 POSTOP FOLLOW-UP VISIT: CPT

## 2025-09-11 DIAGNOSIS — C73 MALIGNANT NEOPLASM OF THYROID GLAND: ICD-10-CM

## 2025-09-11 DIAGNOSIS — E03.9 HYPOTHYROIDISM, UNSPECIFIED: ICD-10-CM

## 2025-09-11 RX ORDER — LEVOTHYROXINE SODIUM 0.14 MG/1
137 TABLET ORAL DAILY
Qty: 30 | Refills: 1 | Status: ACTIVE | COMMUNITY
Start: 2025-09-11 | End: 1900-01-01

## (undated) DEVICE — Device

## (undated) DEVICE — PROTECTOR HEEL / ELBOW FLUFFY

## (undated) DEVICE — SUT SILK 2-0 24" TIES

## (undated) DEVICE — SUT VICRYL 4-0 27" RB-1 UNDYED

## (undated) DEVICE — SOL IRR POUR H2O 500ML

## (undated) DEVICE — DRAPE WARMING SOLUTION 44 X 44"

## (undated) DEVICE — DRAIN RESERVOIR FOR JACKSON PRATT 100CC CARDINAL

## (undated) DEVICE — CANISTER DISPOSABLE THIN WALL 3000CC

## (undated) DEVICE — SUT ETHILON 6-0 18" PS-3

## (undated) DEVICE — STAPLER SKIN PROXIMATE

## (undated) DEVICE — DRAPE MAGNETIC INSTRUMENT MEDIUM

## (undated) DEVICE — NERVE STIMULATOR/LOCATOR HEAD AND NECK MODEL

## (undated) DEVICE — SYR LUER LOK 5CC

## (undated) DEVICE — DRAIN JACKSON PRATT 7FR ROUND END NO TROCAR

## (undated) DEVICE — DRSG STERISTRIPS 0.25 X 3"

## (undated) DEVICE — WARMING BLANKET FULL ADULT

## (undated) DEVICE — SUT SILK 3-0 18" TIES

## (undated) DEVICE — PACK HEAD & NECK

## (undated) DEVICE — DRSG BENZOIN 0.6CC

## (undated) DEVICE — BIPOLAR FORCEP KIRWAN JEWELERS STR 4" X 0.4MM W 12FT CORD (GREEN)

## (undated) DEVICE — WARMING BLANKET LOWER ADULT

## (undated) DEVICE — SUT VICRYL 6-0 18" P-3 UNDYED

## (undated) DEVICE — DRAPE SPLIT SHEET 77" X 120"

## (undated) DEVICE — SUT SILK 2-0 30" SH

## (undated) DEVICE — DRAPE TOWEL BLUE 17" X 24"

## (undated) DEVICE — VENODYNE/SCD SLEEVE CALF MEDIUM

## (undated) DEVICE — SUT SILK 2-0 18" FS

## (undated) DEVICE — STAPLER SKIN VISI-STAT 35 WIDE

## (undated) DEVICE — SAFETY PIN

## (undated) DEVICE — ELCTR GROUNDING PAD ADULT COVIDIEN

## (undated) DEVICE — RUBBER BANDS STERILE

## (undated) DEVICE — DRAPE 3/4 SHEET 52X76"

## (undated) DEVICE — PREP BETADINE SPONGE STICKS

## (undated) DEVICE — DRSG CURITY GAUZE SPONGE 4 X 4" 12-PLY

## (undated) DEVICE — ELCTR BOVIE PENCIL SMOKE EVACUATION

## (undated) DEVICE — DRAPE INSTRUMENT POUCH 6.75" X 11"

## (undated) DEVICE — LABELS BLANK W PEN

## (undated) DEVICE — NDL HYPO SAFE 22G X 1" (BLACK)

## (undated) DEVICE — DRAPE FLUID WARMER 44 X 44"

## (undated) DEVICE — SPONGE DISSECTOR PEANUT

## (undated) DEVICE — POSITIONER FOAM EGG CRATE ULNAR 2PCS (PINK)

## (undated) DEVICE — DRSG STERISTRIPS 0.25 X 4"

## (undated) DEVICE — GLV 7 PROTEXIS (WHITE)

## (undated) DEVICE — DRSG BIOPATCH DISK W CHG 1" W 4.0MM HOLE

## (undated) DEVICE — PROBE HUMMINGBIRD MONO TAPERED 90MM

## (undated) DEVICE — PREP BETADINE KIT

## (undated) DEVICE — LAP PAD W RING 18 X 18"

## (undated) DEVICE — LONE STAR RETRACTOR RING 12MM BLUNT DISP

## (undated) DEVICE — SUT SILK 2-0 18" TIES